# Patient Record
Sex: MALE | Race: WHITE | NOT HISPANIC OR LATINO | Employment: FULL TIME | URBAN - METROPOLITAN AREA
[De-identification: names, ages, dates, MRNs, and addresses within clinical notes are randomized per-mention and may not be internally consistent; named-entity substitution may affect disease eponyms.]

---

## 2018-05-06 LAB
ALBUMIN SERPL BCP-MCNC: 4.2 G/DL (ref 3.5–5.7)
ALP SERPL-CCNC: 85 IU/L (ref 40–150)
ALT SERPL W P-5'-P-CCNC: 298 IU/L (ref 0–50)
ANION GAP SERPL CALCULATED.3IONS-SCNC: 16.1 MM/L
APTT PPP: 27.7 SEC (ref 24.4–37.6)
AST SERPL W P-5'-P-CCNC: 158 U/L (ref 8–27)
BASOPHILS # BLD AUTO: 0.1 X3/UL (ref 0–0.3)
BASOPHILS # BLD AUTO: 0.8 % (ref 0–2)
BILIRUB SERPL-MCNC: 0.3 MG/DL (ref 0.3–1)
BUN SERPL-MCNC: 11 MG/DL (ref 7–25)
CALCIUM SERPL-MCNC: 9.1 MG/DL (ref 8.6–10.5)
CHLORIDE SERPL-SCNC: 105 MM/L (ref 98–107)
CO2 SERPL-SCNC: 22 MM/L (ref 21–31)
CREAT SERPL-MCNC: 0.83 MG/DL (ref 0.7–1.3)
DEPRECATED RDW RBC AUTO: 13.6 % (ref 11.5–14.5)
EGFR (HISTORICAL): > 60 GFR
EGFR AFRICAN AMERICAN (HISTORICAL): > 60 GFR
EOSINOPHIL # BLD AUTO: 0.2 X3/UL (ref 0–0.5)
EOSINOPHIL NFR BLD AUTO: 3.8 % (ref 0–5)
GLUCOSE (HISTORICAL): 141 MG/DL (ref 65–99)
HCT VFR BLD AUTO: 44.8 % (ref 42–52)
HGB BLD-MCNC: 15.4 G/DL (ref 14–18)
INR PPP: 1.12 (ref 0.9–1.5)
LYMPHOCYTES # BLD AUTO: 2.6 X3/UL (ref 1.2–4.2)
LYMPHOCYTES NFR BLD AUTO: 41.5 % (ref 20.5–51.1)
MCH RBC QN AUTO: 31.7 PG (ref 26–34)
MCHC RBC AUTO-ENTMCNC: 34.4 G/DL (ref 31–36)
MCV RBC AUTO: 92 FL (ref 81–99)
MONOCYTES # BLD AUTO: 0.6 X3/UL (ref 0–1)
MONOCYTES NFR BLD AUTO: 9.8 % (ref 1.7–12)
NEUTROPHILS # BLD AUTO: 2.8 X3/UL (ref 1.4–6.5)
NEUTS SEG NFR BLD AUTO: 44.1 % (ref 42.2–75.2)
OSMOLALITY, SERUM (HISTORICAL): 279 MOSM (ref 262–291)
PLATELET # BLD AUTO: 243 X3/UL (ref 130–400)
PMV BLD AUTO: 8.8 FL (ref 8.6–11.7)
POTASSIUM SERPL-SCNC: 4.1 MM/L (ref 3.5–5.5)
PROTHROMBIN TIME (HISTORICAL): 13 SEC (ref 10.1–12.9)
RBC # BLD AUTO: 4.87 X6/UL (ref 4.3–5.9)
SODIUM SERPL-SCNC: 139 MM/L (ref 134–143)
TOTAL PROTEIN (HISTORICAL): 8.2 G/DL (ref 6.4–8.9)
TROPONIN I SERPL-MCNC: < 0.03 NG/ML
WBC # BLD AUTO: 6.2 X3/UL (ref 4.8–10.8)

## 2020-01-23 ENCOUNTER — TRANSCRIBE ORDERS (OUTPATIENT)
Dept: URGENT CARE | Facility: CLINIC | Age: 55
End: 2020-01-23

## 2020-01-23 ENCOUNTER — APPOINTMENT (OUTPATIENT)
Dept: RADIOLOGY | Facility: CLINIC | Age: 55
End: 2020-01-23
Payer: OTHER MISCELLANEOUS

## 2020-01-23 ENCOUNTER — APPOINTMENT (OUTPATIENT)
Dept: URGENT CARE | Facility: CLINIC | Age: 55
End: 2020-01-23
Payer: OTHER MISCELLANEOUS

## 2020-01-23 DIAGNOSIS — S59.909A ELBOW INJURY, INITIAL ENCOUNTER: Primary | ICD-10-CM

## 2020-01-23 DIAGNOSIS — S59.909A ELBOW INJURY, INITIAL ENCOUNTER: ICD-10-CM

## 2020-01-23 PROCEDURE — 99283 EMERGENCY DEPT VISIT LOW MDM: CPT | Performed by: NURSE PRACTITIONER

## 2020-01-23 PROCEDURE — G0382 LEV 3 HOSP TYPE B ED VISIT: HCPCS | Performed by: NURSE PRACTITIONER

## 2020-01-23 PROCEDURE — 73080 X-RAY EXAM OF ELBOW: CPT

## 2020-01-27 ENCOUNTER — APPOINTMENT (OUTPATIENT)
Dept: URGENT CARE | Facility: CLINIC | Age: 55
End: 2020-01-27
Payer: OTHER MISCELLANEOUS

## 2020-01-27 PROCEDURE — 99213 OFFICE O/P EST LOW 20 MIN: CPT

## 2020-02-03 ENCOUNTER — APPOINTMENT (OUTPATIENT)
Dept: URGENT CARE | Facility: CLINIC | Age: 55
End: 2020-02-03
Payer: OTHER MISCELLANEOUS

## 2020-02-03 PROCEDURE — 99213 OFFICE O/P EST LOW 20 MIN: CPT

## 2020-02-10 ENCOUNTER — APPOINTMENT (OUTPATIENT)
Dept: URGENT CARE | Facility: CLINIC | Age: 55
End: 2020-02-10
Payer: OTHER MISCELLANEOUS

## 2020-02-10 PROCEDURE — 99213 OFFICE O/P EST LOW 20 MIN: CPT | Performed by: FAMILY MEDICINE

## 2020-02-17 ENCOUNTER — APPOINTMENT (OUTPATIENT)
Dept: URGENT CARE | Facility: CLINIC | Age: 55
End: 2020-02-17
Payer: OTHER MISCELLANEOUS

## 2020-02-17 PROCEDURE — 99213 OFFICE O/P EST LOW 20 MIN: CPT | Performed by: PHYSICIAN ASSISTANT

## 2021-04-23 ENCOUNTER — TRANSCRIBE ORDERS (OUTPATIENT)
Dept: ADMINISTRATIVE | Facility: HOSPITAL | Age: 56
End: 2021-04-23

## 2021-04-23 DIAGNOSIS — K76.9 LIVER DISEASE: ICD-10-CM

## 2021-04-23 DIAGNOSIS — B19.20 HEPATITIS C VIRUS INFECTION WITHOUT HEPATIC COMA, UNSPECIFIED CHRONICITY: Primary | ICD-10-CM

## 2021-04-23 DIAGNOSIS — R79.89 ABNORMAL LIVER FUNCTION TEST: ICD-10-CM

## 2021-05-03 ENCOUNTER — HOSPITAL ENCOUNTER (OUTPATIENT)
Dept: ULTRASOUND IMAGING | Facility: HOSPITAL | Age: 56
Discharge: HOME | End: 2021-05-03
Payer: COMMERCIAL

## 2021-05-03 DIAGNOSIS — B19.20 HEPATITIS C VIRUS INFECTION WITHOUT HEPATIC COMA, UNSPECIFIED CHRONICITY: ICD-10-CM

## 2021-05-03 DIAGNOSIS — R79.89 ABNORMAL LIVER FUNCTION TEST: ICD-10-CM

## 2021-05-03 DIAGNOSIS — K76.9 LIVER DISEASE: ICD-10-CM

## 2021-05-03 PROCEDURE — 76700 US EXAM ABDOM COMPLETE: CPT

## 2022-03-15 ENCOUNTER — APPOINTMENT (EMERGENCY)
Dept: RADIOLOGY | Facility: HOSPITAL | Age: 57
End: 2022-03-15

## 2022-03-15 ENCOUNTER — APPOINTMENT (EMERGENCY)
Dept: CT IMAGING | Facility: HOSPITAL | Age: 57
End: 2022-03-15

## 2022-03-15 ENCOUNTER — HOSPITAL ENCOUNTER (OUTPATIENT)
Facility: HOSPITAL | Age: 57
Setting detail: OBSERVATION
Discharge: HOME | End: 2022-03-16
Attending: EMERGENCY MEDICINE | Admitting: INTERNAL MEDICINE

## 2022-03-15 ENCOUNTER — APPOINTMENT (OUTPATIENT)
Dept: MRI IMAGING | Facility: HOSPITAL | Age: 57
End: 2022-03-15

## 2022-03-15 DIAGNOSIS — E78.5 HYPERLIPIDEMIA: ICD-10-CM

## 2022-03-15 DIAGNOSIS — E11.9 DIABETES (HCC): ICD-10-CM

## 2022-03-15 DIAGNOSIS — G45.9 TIA (TRANSIENT ISCHEMIC ATTACK): ICD-10-CM

## 2022-03-15 DIAGNOSIS — R73.9 HYPERGLYCEMIA: Primary | ICD-10-CM

## 2022-03-15 PROBLEM — R29.90 STROKE-LIKE SYMPTOMS: Status: ACTIVE | Noted: 2022-03-15

## 2022-03-15 PROBLEM — Z86.19 HISTORY OF HEPATITIS C: Status: ACTIVE | Noted: 2022-03-15

## 2022-03-15 LAB
ANION GAP SERPL CALCULATED.3IONS-SCNC: 10 MMOL/L (ref 4–13)
APTT PPP: 30 SECONDS (ref 23–37)
ATRIAL RATE: 73 BPM
BASE EX.OXY STD BLDV CALC-SCNC: 92 % (ref 60–80)
BASE EXCESS BLDV CALC-SCNC: -0.7 MMOL/L
BASOPHILS # BLD AUTO: 0.03 THOUSANDS/ΜL (ref 0–0.1)
BASOPHILS NFR BLD AUTO: 0 % (ref 0–1)
BETA-HYDROXYBUTYRATE: 1.1 MMOL/L
BILIRUB UR QL STRIP: NEGATIVE
BUN SERPL-MCNC: 28 MG/DL (ref 5–25)
CALCIUM SERPL-MCNC: 9.6 MG/DL (ref 8.4–10.2)
CARDIAC TROPONIN I PNL SERPL HS: 3 NG/L
CHLORIDE SERPL-SCNC: 96 MMOL/L (ref 96–108)
CLARITY UR: CLEAR
CO2 SERPL-SCNC: 24 MMOL/L (ref 21–32)
COLOR UR: YELLOW
CREAT SERPL-MCNC: 0.84 MG/DL (ref 0.6–1.3)
EOSINOPHIL # BLD AUTO: 0.08 THOUSAND/ΜL (ref 0–0.61)
EOSINOPHIL NFR BLD AUTO: 1 % (ref 0–6)
ERYTHROCYTE [DISTWIDTH] IN BLOOD BY AUTOMATED COUNT: 12.8 % (ref 11.6–15.1)
EST. AVERAGE GLUCOSE BLD GHB EST-MCNC: 338 MG/DL
EST. AVERAGE GLUCOSE BLD GHB EST-MCNC: >355 MG/DL
GFR SERPL CREATININE-BSD FRML MDRD: 99 ML/MIN/1.73SQ M
GLUCOSE SERPL-MCNC: 207 MG/DL (ref 65–140)
GLUCOSE SERPL-MCNC: 289 MG/DL (ref 65–140)
GLUCOSE SERPL-MCNC: 299 MG/DL (ref 65–140)
GLUCOSE SERPL-MCNC: 365 MG/DL (ref 65–140)
GLUCOSE UR STRIP-MCNC: ABNORMAL MG/DL
HBA1C MFR BLD: 13.4 %
HBA1C MFR BLD: >14 %
HCO3 BLDV-SCNC: 24 MMOL/L (ref 24–30)
HCT VFR BLD AUTO: 42.6 % (ref 36.5–49.3)
HGB BLD-MCNC: 14.2 G/DL (ref 12–17)
HGB UR QL STRIP.AUTO: NEGATIVE
IMM GRANULOCYTES # BLD AUTO: 0.01 THOUSAND/UL (ref 0–0.2)
IMM GRANULOCYTES NFR BLD AUTO: 0 % (ref 0–2)
INR PPP: 0.99 (ref 0.84–1.19)
KETONES UR STRIP-MCNC: ABNORMAL MG/DL
LEUKOCYTE ESTERASE UR QL STRIP: NEGATIVE
LYMPHOCYTES # BLD AUTO: 2.49 THOUSANDS/ΜL (ref 0.6–4.47)
LYMPHOCYTES NFR BLD AUTO: 35 % (ref 14–44)
MCH RBC QN AUTO: 29.6 PG (ref 26.8–34.3)
MCHC RBC AUTO-ENTMCNC: 33.3 G/DL (ref 31.4–37.4)
MCV RBC AUTO: 89 FL (ref 82–98)
MONOCYTES # BLD AUTO: 0.67 THOUSAND/ΜL (ref 0.17–1.22)
MONOCYTES NFR BLD AUTO: 9 % (ref 4–12)
NEUTROPHILS # BLD AUTO: 3.9 THOUSANDS/ΜL (ref 1.85–7.62)
NEUTS SEG NFR BLD AUTO: 55 % (ref 43–75)
NITRITE UR QL STRIP: NEGATIVE
NRBC BLD AUTO-RTO: 0 /100 WBCS
O2 CT BLDV-SCNC: 19 ML/DL
P AXIS: 73 DEGREES
PCO2 BLDV: 40 MM HG (ref 42–50)
PH BLDV: 7.4 [PH] (ref 7.3–7.4)
PH UR STRIP.AUTO: 5.5 [PH]
PLATELET # BLD AUTO: 219 THOUSANDS/UL (ref 149–390)
PMV BLD AUTO: 10.3 FL (ref 8.9–12.7)
PO2 BLDV: 76.6 MM HG (ref 35–45)
POTASSIUM SERPL-SCNC: 4 MMOL/L (ref 3.5–5.3)
PR INTERVAL: 186 MS
PROT UR STRIP-MCNC: NEGATIVE MG/DL
PROTHROMBIN TIME: 13 SECONDS (ref 11.6–14.5)
QRS AXIS: 66 DEGREES
QRSD INTERVAL: 80 MS
QT INTERVAL: 380 MS
QTC INTERVAL: 418 MS
RBC # BLD AUTO: 4.8 MILLION/UL (ref 3.88–5.62)
SODIUM SERPL-SCNC: 130 MMOL/L (ref 135–147)
SP GR UR STRIP.AUTO: 1.01 (ref 1–1.03)
T WAVE AXIS: 54 DEGREES
TSH SERPL DL<=0.05 MIU/L-ACNC: 0.49 UIU/ML (ref 0.45–5.33)
UROBILINOGEN UR QL STRIP.AUTO: 0.2 E.U./DL
VENTRICULAR RATE: 73 BPM
WBC # BLD AUTO: 7.18 THOUSAND/UL (ref 4.31–10.16)

## 2022-03-15 PROCEDURE — 82948 REAGENT STRIP/BLOOD GLUCOSE: CPT

## 2022-03-15 PROCEDURE — 80048 BASIC METABOLIC PNL TOTAL CA: CPT | Performed by: EMERGENCY MEDICINE

## 2022-03-15 PROCEDURE — 84443 ASSAY THYROID STIM HORMONE: CPT | Performed by: NURSE PRACTITIONER

## 2022-03-15 PROCEDURE — 85610 PROTHROMBIN TIME: CPT | Performed by: EMERGENCY MEDICINE

## 2022-03-15 PROCEDURE — 96361 HYDRATE IV INFUSION ADD-ON: CPT

## 2022-03-15 PROCEDURE — 85730 THROMBOPLASTIN TIME PARTIAL: CPT | Performed by: EMERGENCY MEDICINE

## 2022-03-15 PROCEDURE — 93005 ELECTROCARDIOGRAM TRACING: CPT

## 2022-03-15 PROCEDURE — G1004 CDSM NDSC: HCPCS

## 2022-03-15 PROCEDURE — 83036 HEMOGLOBIN GLYCOSYLATED A1C: CPT | Performed by: NURSE PRACTITIONER

## 2022-03-15 PROCEDURE — 99220 PR INITIAL OBSERVATION CARE/DAY 70 MINUTES: CPT | Performed by: NURSE PRACTITIONER

## 2022-03-15 PROCEDURE — 82805 BLOOD GASES W/O2 SATURATION: CPT | Performed by: EMERGENCY MEDICINE

## 2022-03-15 PROCEDURE — 96360 HYDRATION IV INFUSION INIT: CPT

## 2022-03-15 PROCEDURE — 99285 EMERGENCY DEPT VISIT HI MDM: CPT

## 2022-03-15 PROCEDURE — 85025 COMPLETE CBC W/AUTO DIFF WBC: CPT | Performed by: EMERGENCY MEDICINE

## 2022-03-15 PROCEDURE — 70551 MRI BRAIN STEM W/O DYE: CPT

## 2022-03-15 PROCEDURE — 36415 COLL VENOUS BLD VENIPUNCTURE: CPT | Performed by: EMERGENCY MEDICINE

## 2022-03-15 PROCEDURE — 70496 CT ANGIOGRAPHY HEAD: CPT

## 2022-03-15 PROCEDURE — 81003 URINALYSIS AUTO W/O SCOPE: CPT | Performed by: EMERGENCY MEDICINE

## 2022-03-15 PROCEDURE — 70498 CT ANGIOGRAPHY NECK: CPT

## 2022-03-15 PROCEDURE — 92610 EVALUATE SWALLOWING FUNCTION: CPT

## 2022-03-15 PROCEDURE — 83036 HEMOGLOBIN GLYCOSYLATED A1C: CPT | Performed by: EMERGENCY MEDICINE

## 2022-03-15 PROCEDURE — 99285 EMERGENCY DEPT VISIT HI MDM: CPT | Performed by: EMERGENCY MEDICINE

## 2022-03-15 PROCEDURE — 71045 X-RAY EXAM CHEST 1 VIEW: CPT

## 2022-03-15 PROCEDURE — 82010 KETONE BODYS QUAN: CPT | Performed by: EMERGENCY MEDICINE

## 2022-03-15 PROCEDURE — 93010 ELECTROCARDIOGRAM REPORT: CPT | Performed by: INTERNAL MEDICINE

## 2022-03-15 PROCEDURE — 84484 ASSAY OF TROPONIN QUANT: CPT | Performed by: EMERGENCY MEDICINE

## 2022-03-15 RX ORDER — ATORVASTATIN CALCIUM 40 MG/1
80 TABLET, FILM COATED ORAL
Status: DISCONTINUED | OUTPATIENT
Start: 2022-03-15 | End: 2022-03-15

## 2022-03-15 RX ORDER — ASPIRIN 81 MG/1
81 TABLET, CHEWABLE ORAL DAILY
Status: DISCONTINUED | OUTPATIENT
Start: 2022-03-16 | End: 2022-03-16 | Stop reason: HOSPADM

## 2022-03-15 RX ORDER — ASCORBIC ACID 500 MG
500 TABLET ORAL 2 TIMES DAILY
COMMUNITY

## 2022-03-15 RX ORDER — ASCORBIC ACID 500 MG
500 TABLET ORAL 2 TIMES DAILY
Status: DISCONTINUED | OUTPATIENT
Start: 2022-03-15 | End: 2022-03-16 | Stop reason: HOSPADM

## 2022-03-15 RX ORDER — ACETAMINOPHEN 325 MG/1
650 TABLET ORAL EVERY 6 HOURS PRN
Status: DISCONTINUED | OUTPATIENT
Start: 2022-03-15 | End: 2022-03-16 | Stop reason: HOSPADM

## 2022-03-15 RX ORDER — INSULIN GLARGINE 100 [IU]/ML
8 INJECTION, SOLUTION SUBCUTANEOUS EVERY MORNING
Status: DISCONTINUED | OUTPATIENT
Start: 2022-03-16 | End: 2022-03-16 | Stop reason: HOSPADM

## 2022-03-15 RX ORDER — ERGOCALCIFEROL (VITAMIN D2) 1250 MCG
50000 CAPSULE ORAL
COMMUNITY

## 2022-03-15 RX ORDER — ASPIRIN 325 MG
325 TABLET ORAL ONCE
Status: COMPLETED | OUTPATIENT
Start: 2022-03-15 | End: 2022-03-15

## 2022-03-15 RX ORDER — SODIUM CHLORIDE, SODIUM GLUCONATE, SODIUM ACETATE, POTASSIUM CHLORIDE, MAGNESIUM CHLORIDE, SODIUM PHOSPHATE, DIBASIC, AND POTASSIUM PHOSPHATE .53; .5; .37; .037; .03; .012; .00082 G/100ML; G/100ML; G/100ML; G/100ML; G/100ML; G/100ML; G/100ML
100 INJECTION, SOLUTION INTRAVENOUS CONTINUOUS
Status: DISCONTINUED | OUTPATIENT
Start: 2022-03-15 | End: 2022-03-16 | Stop reason: HOSPADM

## 2022-03-15 RX ORDER — INSULIN GLARGINE 100 [IU]/ML
8 INJECTION, SOLUTION SUBCUTANEOUS ONCE
Status: DISCONTINUED | OUTPATIENT
Start: 2022-03-15 | End: 2022-03-16 | Stop reason: HOSPADM

## 2022-03-15 RX ORDER — ATORVASTATIN CALCIUM 40 MG/1
40 TABLET, FILM COATED ORAL EVERY EVENING
Status: DISCONTINUED | OUTPATIENT
Start: 2022-03-15 | End: 2022-03-15

## 2022-03-15 RX ORDER — ATORVASTATIN CALCIUM 40 MG/1
40 TABLET, FILM COATED ORAL
Status: DISCONTINUED | OUTPATIENT
Start: 2022-03-16 | End: 2022-03-16 | Stop reason: HOSPADM

## 2022-03-15 RX ADMIN — INSULIN LISPRO 3 UNITS: 100 INJECTION, SOLUTION INTRAVENOUS; SUBCUTANEOUS at 17:25

## 2022-03-15 RX ADMIN — SODIUM CHLORIDE, SODIUM GLUCONATE, SODIUM ACETATE, POTASSIUM CHLORIDE, MAGNESIUM CHLORIDE, SODIUM PHOSPHATE, DIBASIC, AND POTASSIUM PHOSPHATE 100 ML/HR: .53; .5; .37; .037; .03; .012; .00082 INJECTION, SOLUTION INTRAVENOUS at 14:14

## 2022-03-15 RX ADMIN — OXYCODONE HYDROCHLORIDE AND ACETAMINOPHEN 500 MG: 500 TABLET ORAL at 17:25

## 2022-03-15 RX ADMIN — Medication 1 TABLET: at 14:14

## 2022-03-15 RX ADMIN — INSULIN LISPRO 6 UNITS: 100 INJECTION, SOLUTION INTRAVENOUS; SUBCUTANEOUS at 21:32

## 2022-03-15 RX ADMIN — SODIUM CHLORIDE 1000 ML: 0.9 INJECTION, SOLUTION INTRAVENOUS at 09:15

## 2022-03-15 RX ADMIN — INSULIN LISPRO 2 UNITS: 100 INJECTION, SOLUTION INTRAVENOUS; SUBCUTANEOUS at 17:25

## 2022-03-15 RX ADMIN — ASPIRIN 325 MG: 325 TABLET ORAL at 14:14

## 2022-03-15 RX ADMIN — IOHEXOL 85 ML: 350 INJECTION, SOLUTION INTRAVENOUS at 09:55

## 2022-03-15 NOTE — ASSESSMENT & PLAN NOTE
· The patient admits to visual disturbances, dizziness, generalized weakness which has been going on for the past 2-3 weeks  · Suspected that this could be related to hyperglycemia/newly diagnosed diabetes however will rule out TIA/CVA  · Stroke pathway  · Neurologic consult  · MRI brain  · Will hold off on echocardiogram for now  · Hemoglobin A1c, lipid panel

## 2022-03-15 NOTE — SPEECH THERAPY NOTE
Speech Language/Pathology  Speech/Language Pathology  Assessment    Patient Name: Carolina Castillo  [id number] Date: 3/15/2022     Problem List  Principal Problem:    Hyperglycemia  Active Problems:    History of hepatitis C    Stroke-like symptoms    Past Medical History  Past Medical History:   Diagnosis Date    Cirrhosis ([facility] [address] )     Hepatitis C virus infection cured after antiviral drug therapy      Past Surgical History  History reviewed  No pertinent surgical history  03/15/22 1800   Patient Information   Current Medical hyperglycemia vs CVA work up   Special Studies CT   Past Medical History none noted on chart   Swallow Information   Current Risks for Dysphagia & Aspiration   (None)   Current Diet Regular; Thin liquid   Baseline Diet Regular; Thin liquids   Baseline Assessment   Behavior/Cognition Alert; Cooperative; Interactive   Speech/Language Status WFL   Patient Positioning Upright in bed   Swallow Mechanism Exam   Labial Symmetry WFL   Labial Strength WFL   Labial ROM WFL   Labial Sensation WFL   Facial Symmetry WFL   Facial Strength WFL   Facial ROM WFL   Facial Sensation WFL   Lingual Symmetry WFL   Lingual Strength WFL   Lingual ROM WFL   Lingual Sensation WFL   Dentition Adequate   Volitional Cough Strong   Consistencies Assessed and Performance   Materials Admnistered Regular/Solid; Thin liquid   Oral Stage WFL   Phargngeal Stage WFL   Swallow Mechanics WFL;Swallow initation; Appears prompt;Good Larygneal rise   Esophageal Concerns No s/s reported   Summary   Swallow Summary Patient demonstrating appropriate swallow function and engaged in complex conversation topics with ease  Pt demonstrates no overt s/s of penetration or aspiration, clear vocal quality and no ongoing concerns  No ST warranted for f/u

## 2022-03-15 NOTE — PLAN OF CARE
Problem: Potential for Falls  Goal: Patient will remain free of falls  Description: INTERVENTIONS:  - Educate patient/family on patient safety including physical limitations  - Instruct patient to call for assistance with activity   - Consult OT/PT to assist with strengthening/mobility   - Keep Call bell within reach  - Keep bed low and locked with side rails adjusted as appropriate  - Keep care items and personal belongings within reach  - Initiate and maintain comfort rounds  - Make Fall Risk Sign visible to staff  Problem: Neurological Deficit  Goal: Neurological status is stable or improving  Description: Interventions:  - Monitor and assess patient's level of consciousness, motor function, sensory function, and level of assistance needed for ADLs  - Monitor and report changes from baseline  Collaborate with interdisciplinary team to initiate plan and implement interventions as ordered  - Provide and maintain a safe environment  - Consider seizure precautions  - Consider fall precautions  - Consider aspiration precautions  - Consider bleeding precautions  3/15/2022 1620 by Mimi Montero RN  Outcome: Progressing  3/15/2022 1616 by Mimi Montero RN  Outcome: Progressing     Problem: Activity Intolerance/Impaired Mobility  Goal: Mobility/activity is maintained at optimum level for patient  Description: Interventions:  - Assess and monitor patient  barriers to mobility and need for assistive/adaptive devices  - Assess patient's emotional response to limitations  - Collaborate with interdisciplinary team and initiate plans and interventions as ordered  - Encourage independent activity per ability   - Maintain proper body alignment  - Perform active/passive rom as tolerated/ordered    - Plan activities to conserve energy   - Turn patient as appropriate  3/15/2022 1620 by Mimi Montero RN  Outcome: Progressing  3/15/2022 1616 by Mimi Montero RN  Outcome: Progressing     Problem: Communication Impairment  Goal: Ability to express needs and understand communication  Description: Assess patient's communication skills and ability to understand information  Patient will demonstrate use of effective communication techniques, alternative methods of communication and understanding even if not able to speak  - Encourage communication and provide alternate methods of communication as needed  - Collaborate with case management/ for discharge needs  - Include patient/family/caregiver in decisions related to communication  3/15/2022 1620 by Marquis Yossi RN  Outcome: Progressing  3/15/2022 1616 by Marquis Yossi RN  Outcome: Progressing     Problem: Potential for Aspiration  Goal: Non-ventilated patient's risk of aspiration is minimized  Description: Assess and monitor vital signs, respiratory status, and labs (WBC)  Monitor for signs of aspiration (tachypnea, cough, rales, wheezing, cyanosis, fever)  - Assess and monitor patient's ability to swallow  - Place patient up in chair to eat if possible  - HOB up at 90 degrees to eat if unable to get patient up into chair   - Supervise patient during oral intake  - Instruct patient/ family to take small bites  - Instruct patient/ family to take small single sips when taking liquids  - Follow patient-specific strategies generated by speech pathologist   Outcome: Progressing  Goal: Ventilated patient's risk of aspiration is minimized  Description: Assess and monitor vital signs, respiratory status, airway cuff pressure, and labs (WBC)  Monitor for signs of aspiration (tachypnea, cough, rales, wheezing, cyanosis, fever)  - Elevate head of bed 30 degrees if patient has tube feeding   - Monitor tube feeding    Outcome: Progressing     Problem: NEUROSENSORY - ADULT  Goal: Achieves stable or improved neurological status  Description: INTERVENTIONS  - Monitor and report changes in neurological status  - Monitor vital signs such as temperature, blood pressure, glucose, and any other labs ordered   - Initiate measures to prevent increased intracranial pressure  - Monitor for seizure activity and implement precautions if appropriate      Outcome: Progressing     Problem: CARDIOVASCULAR - ADULT  Goal: Maintains optimal cardiac output and hemodynamic stability  Description: INTERVENTIONS:  - Monitor I/O, vital signs and rhythm  - Monitor for S/S and trends of decreased cardiac output  - Administer and titrate ordered vasoactive medications to optimize hemodynamic stability  - Assess quality of pulses, skin color and temperature  - Assess for signs of decreased coronary artery perfusion  - Instruct patient to report change in severity of symptoms  Outcome: Progressing     Problem: METABOLIC, FLUID AND ELECTROLYTES - ADULT  Goal: Electrolytes maintained within normal limits  Description: INTERVENTIONS:  - Monitor labs and assess patient for signs and symptoms of electrolyte imbalances  - Administer electrolyte replacement as ordered  - Monitor response to electrolyte replacements, including repeat lab results as appropriate  - Instruct patient on fluid and nutrition as appropriate  Outcome: Progressing  Goal: Fluid balance maintained  Description: INTERVENTIONS:  - Monitor labs   - Monitor I/O and WT  - Instruct patient on fluid and nutrition as appropriate  - Assess for signs & symptoms of volume excess or deficit  Outcome: Progressing  Goal: Glucose maintained within target range  Description: INTERVENTIONS:  - Monitor Blood Glucose as ordered  - Assess for signs and symptoms of hyperglycemia and hypoglycemia  - Administer ordered medications to maintain glucose within target range  - Assess nutritional intake and initiate nutrition service referral as needed  Outcome: Progressing     Problem: Nutrition/Hydration-ADULT  Goal: Nutrient/Hydration intake appropriate for improving, restoring or maintaining nutritional needs  Description: Monitor and assess patient's nutrition/hydration status for malnutrition  Collaborate with interdisciplinary team and initiate plan and interventions as ordered  Monitor patient's weight and dietary intake as ordered or per policy  Utilize nutrition screening tool and intervene as necessary  Determine patient's food preferences and provide high-protein, high-caloric foods as appropriate       INTERVENTIONS:  - Monitor oral intake, urinary output, labs, and treatment plans  - Assess nutrition and hydration status and recommend course of action  - Evaluate amount of meals eaten  - Assist patient with eating if necessary   - Allow adequate time for meals  - Recommend/ encourage appropriate diets, oral nutritional supplements, and vitamin/mineral supplements  - Order, calculate, and assess calorie counts as needed  - Recommend, monitor, and adjust tube feedings and TPN/PPN based on assessed needs  - Assess need for intravenous fluids  - Provide specific nutrition/hydration education as appropriate  - Include patient/family/caregiver in decisions related to nutrition  Outcome: Progressing     Problem: PAIN - ADULT  Goal: Verbalizes/displays adequate comfort level or baseline comfort level  Description: Interventions:  - Encourage patient to monitor pain and request assistance  - Assess pain using appropriate pain scale  - Administer analgesics based on type and severity of pain and evaluate response  - Implement non-pharmacological measures as appropriate and evaluate response  - Consider cultural and social influences on pain and pain management  - Notify physician/advanced practitioner if interventions unsuccessful or patient reports new pain  Outcome: Progressing     Problem: SAFETY ADULT  Goal: Patient will remain free of falls  Description: INTERVENTIONS:  - Educate patient/family on patient safety including physical limitations  - Instruct patient to call for assistance with activity   - Consult OT/PT to assist with strengthening/mobility   - Keep Call bell within reach  - Keep bed low and locked with side rails adjusted as appropriate  - Keep care items and personal belongings within reach  - Initiate and maintain comfort rounds  - Make Fall Risk Sign visible to staff  - Offer Toileting every 2 Hours, in advance of need     Apply yellow socks and bracelet for high fall risk patients  - Consider moving patient to room near nurses station  Outcome: Progressing  Goal: Maintain or return to baseline ADL function  Description: INTERVENTIONS:  -  Assess patient's ability to carry out ADLs; assess patient's baseline for ADL function and identify physical deficits which impact ability to perform ADLs (bathing, care of mouth/teeth, toileting, grooming, dressing, etc )  - Assess/evaluate cause of self-care deficits   - Assess range of motion  - Assess patient's mobility; develop plan if impaired  - Assess patient's need for assistive devices and provide as appropriate  - Encourage maximum independence but intervene and supervise when necessary  - Involve family in performance of ADLs  - Assess for home care needs following discharge   - Consider OT consult to assist with ADL evaluation and planning for discharge  - Provide patient education as appropriate  Outcome: Progressing  tivity level   Outcome: Progressing     - Consider moving patient to room near nurses station  Outcome: Progressing

## 2022-03-15 NOTE — ASSESSMENT & PLAN NOTE
· Hemoglobin A1c of 7 8 on 11/2021  · This was found incidentally on his blood work while he was under hepatitis C treatment  · The patient has been checking his blood sugar at home and noticed that it has been between 400-500  Currently not on any medications  The patient did have some diet modifications and has lost almost 40 lb over the past few months  Has just established care with a new PCP and the appointment is not until April    · Will give 8 units of Lantus and Humalog 5 units now  · Continue with IV fluid  · No evidence of DKA, mild elevations of beta hydroxybutyrate- 1 1   · Pseudo hyponatremia due to hyperglycemia  · Started on SSI, meals coverage with lantus; pending repeat hemoglobin A1c and will need regimen for anti hyperglycemia on discharge and possible endocrinology follow-up

## 2022-03-15 NOTE — ED NOTES
Per Dr Mike Zavala, patient is not a stroke alert but will require stroke imaging       Sarah Obregon RN  03/15/22 9621

## 2022-03-15 NOTE — ED PROVIDER NOTES
History  Chief Complaint   Patient presents with    Hyperglycemia - Symptomatic     started noticing last year that fasting glucose in the mid 100's  over the last few days sugar has been in excess of 500  visual changes  61-year-old male presenting for elevated blood sugars  Wife is present in the room states that also approximately 1 hour prior to arrival patient had a few minute episode of slurring of his speech but no other symptoms  This resolved on its own  He reportedly has had this a few times over last 6 months  He states he started noticing his elevated blood sugar over last 6 months to 1 year although over last 6 months he says this got significantly worse  He also states that he feels like his vision is getting worse  Denies sore throat, cough, shortness of breath, abdominal pain, vomiting diarrhea constipation dysuria, hematuria  Patient also complaining of increased thirst, increased urinary frequency, chest pain which started earlier today  Prior to Admission Medications   Prescriptions Last Dose Informant Patient Reported? Taking? Garlic 840 MG CAPS   Yes No   Sig: Take by mouth   Multiple Vitamin (MULTIVITAMIN ADULT PO)   Yes No   Sig: Take 1 tablet by mouth daily   OMEGA-3 FATTY ACIDS PO   Yes No   Sig: Take by mouth   ascorbic acid (VITAMIN C) 500 MG tablet   Yes No   Sig: Take 500 mg by mouth 2 (two) times a day   ergocalciferol (ERGOCALCIFEROL) 1 25 MG (35960 UT) capsule   Yes No   Sig: Take 50,000 Units by mouth      Facility-Administered Medications: None       Past Medical History:   Diagnosis Date    Cirrhosis ([facility] [address] )     Hepatitis C virus infection cured after antiviral drug therapy        History reviewed  No pertinent surgical history  Family History   Problem Relation Age of Onset    Diabetes Mother     Diabetes Father      I have reviewed and agree with the history as documented      E-Cigarette/Vaping     E-Cigarette/Vaping Substances     Social History Tobacco Use    Smoking status: Former Smoker     Quit date:      Years since quittin 2    Smokeless tobacco: Never Used   Substance Use Topics    Alcohol use: Not Currently    Drug use: Not Currently       Review of Systems   Cardiovascular: Positive for chest pain  Gastrointestinal: Positive for nausea  Neurological: Positive for speech difficulty (resolved) and headaches  Negative for dizziness, facial asymmetry, weakness, light-headedness and numbness  All other systems reviewed and are negative  Physical Exam  Physical Exam  Vitals and nursing note reviewed  Constitutional:       General: He is not in acute distress  Appearance: He is well-developed  He is not diaphoretic  HENT:      Head: Normocephalic and atraumatic  Right Ear: External ear normal       Left Ear: External ear normal       Nose: Nose normal    Eyes:      General: No scleral icterus  Right eye: No discharge  Left eye: No discharge  Conjunctiva/sclera: Conjunctivae normal    Cardiovascular:      Rate and Rhythm: Normal rate and regular rhythm  Heart sounds: Normal heart sounds  No murmur heard  No friction rub  No gallop  Pulmonary:      Effort: Pulmonary effort is normal  No respiratory distress  Breath sounds: Normal breath sounds  No wheezing or rales  Abdominal:      General: Bowel sounds are normal  There is no distension  Palpations: Abdomen is soft  There is no mass  Tenderness: There is no abdominal tenderness  There is no guarding  Musculoskeletal:         General: No tenderness or deformity  Normal range of motion  Cervical back: Normal range of motion and neck supple  Skin:     General: Skin is warm and dry  Coloration: Skin is not pale  Findings: No erythema or rash  Neurological:      General: No focal deficit present  Mental Status: He is alert and oriented to person, place, and time  Mental status is at baseline  Cranial Nerves: No cranial nerve deficit  Sensory: No sensory deficit  Motor: No weakness  Coordination: Coordination normal       Gait: Gait normal       Comments: 5/5 strength x 4 extremities  Gross sensation intact  CN intact  No Dysmetria or Dysdiadochokinesia  GCS 15  No pronator drift  No gait ataxia      Psychiatric:         Behavior: Behavior normal          Thought Content:  Thought content normal          Judgment: Judgment normal          Vital Signs  ED Triage Vitals [03/15/22 0843]   Temperature Pulse Respirations Blood Pressure SpO2   (!) 96 8 °F (36 °C) 74 18 145/81 96 %      Temp Source Heart Rate Source Patient Position - Orthostatic VS BP Location FiO2 (%)   Tympanic Monitor Sitting Left arm --      Pain Score       2           Vitals:    03/15/22 1050 03/15/22 1100 03/15/22 1130 03/15/22 1200   BP: 123/72 124/75 118/74 128/79   Pulse: 59 56 56 58   Patient Position - Orthostatic VS:             Visual Acuity  Visual Acuity      Most Recent Value   L Pupil Size (mm) 3   R Pupil Size (mm) 3          ED Medications  Medications   aspirin tablet 325 mg (has no administration in time range)   atorvastatin (LIPITOR) tablet 80 mg (has no administration in time range)   sodium chloride 0 9 % bolus 1,000 mL (0 mL Intravenous Stopped 3/15/22 1115)   iohexol (OMNIPAQUE) 350 MG/ML injection (SINGLE-DOSE) 85 mL (85 mL Intravenous Given 3/15/22 0955)       Diagnostic Studies  Results Reviewed     Procedure Component Value Units Date/Time    HS Troponin 0hr (reflex protocol) [[id number]]  (Normal) Collected: 03/15/22 0910    Lab Status: Final result Specimen: Blood from Arm, Right Updated: 03/15/22 0945     hs TnI 0hr 3 ng/L     Basic metabolic panel [[id number]]  (Abnormal) Collected: 03/15/22 0910    Lab Status: Final result Specimen: Blood from Arm, Right Updated: 03/15/22 0941     Sodium 130 mmol/L      Potassium 4 0 mmol/L      Chloride 96 mmol/L      CO2 24 mmol/L      ANION GAP 10 mmol/L BUN 28 mg/dL      Creatinine 0 84 mg/dL      Glucose 299 mg/dL      Calcium 9 6 mg/dL      eGFR 99 ml/min/1 73sq m     Narrative:      National Kidney Disease Foundation guidelines for Chronic Kidney Disease (CKD):     Stage 1 with normal or high GFR (GFR > 90 mL/min/1 73 square meters)    Stage 2 Mild CKD (GFR = 60-89 mL/min/1 73 square meters)    Stage 3A Moderate CKD (GFR = 45-59 mL/min/1 73 square meters)    Stage 3B Moderate CKD (GFR = 30-44 mL/min/1 73 square meters)    Stage 4 Severe CKD (GFR = 15-29 mL/min/1 73 square meters)    Stage 5 End Stage CKD (GFR <15 mL/min/1 73 square meters)  Note: GFR calculation is accurate only with a steady state creatinine    Protime-INR [[id number]]  (Normal) Collected: 03/15/22 0910    Lab Status: Final result Specimen: Blood from Arm, Right Updated: 03/15/22 0933     Protime 13 0 seconds      INR 0 99    APTT [[id number]]  (Normal) Collected: 03/15/22 0910    Lab Status: Final result Specimen: Blood from Arm, Right Updated: 03/15/22 0933     PTT 30 seconds     Blood gas, venous [[id number]]  (Abnormal) Collected: 03/15/22 0910    Lab Status: Final result Specimen: Blood from Arm, Right Updated: 03/15/22 0927     pH, Vitaliy 7 396     pCO2, Vitaliy 40 0 mm Hg      pO2, Vitaliy 76 6 mm Hg      HCO3, Vitaliy 24 0 mmol/L      Base Excess, Vitaliy -0 7 mmol/L      O2 Content, Vitaliy 19 0 ml/dL      O2 HGB, VENOUS 92 0 %     UA w Reflex to Microscopic w Reflex to Culture [[id number]]  (Abnormal) Collected: 03/15/22 0915    Lab Status: Final result Specimen: Urine, Clean Catch Updated: 03/15/22 0925     Color, UA Yellow     Clarity, UA Clear     Specific Gravity, UA 1 010     pH, UA 5 5     Leukocytes, UA Negative     Nitrite, UA Negative     Protein, UA Negative mg/dl      Glucose, UA 3+ mg/dl      Ketones, UA 15 (1+) mg/dl      Urobilinogen, UA 0 2 E U /dl      Bilirubin, UA Negative     Blood, UA Negative    Beta Hydroxybutyrate [[id number]]  (Abnormal) Collected: 03/15/22 0910    Lab Status: Final result Specimen: Blood from Arm, Right Updated: 03/15/22 0922     BETA-HYDROXYBUTYRATE 1 1 mmol/L     CBC and differential [[id number]] Collected: 03/15/22 0910    Lab Status: Final result Specimen: Blood from Arm, Right Updated: 03/15/22 0922     WBC 7 18 Thousand/uL      RBC 4 80 Million/uL      Hemoglobin 14 2 g/dL      Hematocrit 42 6 %      MCV 89 fL      MCH 29 6 pg      MCHC 33 3 g/dL      RDW 12 8 %      MPV 10 3 fL      Platelets 520 Thousands/uL      nRBC 0 /100 WBCs      Neutrophils Relative 55 %      Immat GRANS % 0 %      Lymphocytes Relative 35 %      Monocytes Relative 9 %      Eosinophils Relative 1 %      Basophils Relative 0 %      Neutrophils Absolute 3 90 Thousands/µL      Immature Grans Absolute 0 01 Thousand/uL      Lymphocytes Absolute 2 49 Thousands/µL      Monocytes Absolute 0 67 Thousand/µL      Eosinophils Absolute 0 08 Thousand/µL      Basophils Absolute 0 03 Thousands/µL     Hemoglobin A1C [[id number]] Collected: 03/15/22 0910    Lab Status: In process Specimen: Blood from Arm, Right Updated: 03/15/22 0918    Fingerstick Glucose (POCT) [[id number]]  (Abnormal) Collected: 03/15/22 0854    Lab Status: Final result Updated: 03/15/22 0902     POC Glucose 289 mg/dl                  CTA head and neck with and without contrast   Final Result by Paul Pisano MD (03/15 1026)      1  No acute intracranial CT abnormality  2   Unremarkable CT angiogram of the head and neck  3   Mild right maxillary sinus disease  Workstation performed: [id number]         XR chest 1 view portable   Final Result by Freeman Ceja MD (03/15 1006)      No acute cardiopulmonary disease                    Workstation performed: [id number]                    Procedures  ECG 12 Lead Documentation Only    Date/Time: 3/15/2022 10:49 AM  Performed by: Lianne Burton DO  Authorized by: Lianne Burton DO     Patient location:  ED  Previous ECG:     Previous ECG: Unavailable  Interpretation:     Interpretation: normal    Rate:     ECG rate:  73    ECG rate assessment: normal    Rhythm:     Rhythm: sinus rhythm    Ectopy:     Ectopy: none    QRS:     QRS axis:  Normal    QRS intervals:  Normal  Conduction:     Conduction: normal    ST segments:     ST segments:  Normal  T waves:     T waves: normal               ED Course  ED Course as of 03/15/22 1240   Tue Mar 15, 2022   1025 Discussed case with Neurology as patient may have had TIA prior to arrival   Agree with plan for now  Will admit patient stroke pathway  HEART Risk Score      Most Recent Value   Heart Score Risk Calculator    History 0 Filed at: 03/15/2022 1050   ECG 0 Filed at: 03/15/2022 1050   Age 1 Filed at: 03/15/2022 1050   Risk Factors 0 Filed at: 03/15/2022 1050   Troponin 0 Filed at: 03/15/2022 1050   HEART Score 1 Filed at: 03/15/2022 1050           Stroke Assessment     Row Name 03/15/22 1213             NIH Stroke Scale    Interval Baseline      Level of Consciousness (1a ) 0      LOC Questions (1b ) 0      LOC Commands (1c ) 0      Best Gaze (2 ) 0      Visual (3 ) 0      Facial Palsy (4 ) 0      Motor Arm, Left (5a ) 0      Motor Arm, Right (5b ) 0      Motor Leg, Left (6a ) 0      Motor Leg, Right (6b ) 0      Limb Ataxia (7 ) 0      Sensory (8 ) 0      Best Language (9 ) 0      Dysarthria (10 ) 0      Extinction and Inattention (11 ) (Formerly Neglect) 0      Total 0                Most Recent Value   TPA Decision Options    TPA Decision Patient not a TPA candidate  Patient is not a candidate options Symptoms resolved/clearly non disabling  SBIRT 22yo+      Most Recent Value   SBIRT (24 yo +)    In order to provide better care to our patients, we are screening all of our patients for alcohol and drug use  Would it be okay to ask you these screening questions? Yes Filed at: 03/15/2022 4773   Initial Alcohol Screen: US AUDIT-C     1   How often do you have a drink containing alcohol? 0 Filed at: 03/15/2022 0905   2  How many drinks containing alcohol do you have on a typical day you are drinking? 0 Filed at: 03/15/2022 0905   3a  Male UNDER 65: How often do you have five or more drinks on one occasion? 0 Filed at: 03/15/2022 0905   3b  FEMALE Any Age, or MALE 65+: How often do you have 4 or more drinks on one occassion? 0 Filed at: 03/15/2022 0905   Audit-C Score 0 Filed at: 03/15/2022 1405   BRENDA: How many times in the past year have you    Used an illegal drug or used a prescription medication for non-medical reasons? Never Filed at: 03/15/2022 0905                    MDM  Number of Diagnoses or Management Options  Diabetes ([facility] [address] )  Hyperglycemia  TIA (transient ischemic attack)  Diagnosis management comments: Concern for possible TIA earlier today as patient had reported episode of a few minutes of slurring of speech with no other symptoms  Patient does have elevated blood glucose  Will check blood work, CTA head neck, EKG, troponin  Patient will likely require admission on stroke pathway  Discussed with neurology patient given atorvastatin and aspirin  Disposition  Final diagnoses:   Hyperglycemia   Diabetes ([facility] [address] )   TIA (transient ischemic attack)     Time reflects when diagnosis was documented in both MDM as applicable and the Disposition within this note     Time User Action Codes Description Comment    3/15/2022 12:07 PM Vergie Hamming Add [R73 9] Hyperglycemia     3/15/2022 12:07 PM Vergie Hamming Add [E11 9] Diabetes ([facility] [address] )     3/15/2022 12:07 PM Vergie Hamming Add [G45 9] TIA (transient ischemic attack)       ED Disposition     ED Disposition Condition Date/Time Comment    Admit Stable Tue Mar 15, 2022 12:07 PM Case was discussed with VALENTIN and the patient's admission status was agreed to be Admission Status: observation status to the service of Dr Mikhail Faustin          Follow-up Information    None         Patient's Medications   Discharge Prescriptions No medications on file       No discharge procedures on file      PDMP Review     None          ED Provider  Electronically Signed by           Stacy Beltre, DO  03/15/22 One Arnav Salcedo, DO  03/15/22 2813

## 2022-03-15 NOTE — H&P
[facility]  H&P- Nila Curtis 1965, 49 y o  male MRN: [id number]  Unit/Bed#: -02 Encounter: [id number]  Primary Care Provider: DEVON Avila   Date and time admitted to hospital: 3/15/2022  8:39 AM    * Hyperglycemia  Assessment & Plan  · Hemoglobin A1c of 7 8 on 11/2021  · This was found incidentally on his blood work while he was under hepatitis C treatment  · The patient has been checking his blood sugar at home and noticed that it has been between 400-500  Currently not on any medications  The patient did have some diet modifications and has lost almost 40 lb over the past few months  Has just established care with a new PCP and the appointment is not until April  · Will give 8 units of Lantus and Humalog 5 units now  · Continue with IV fluid  · No evidence of DKA, mild elevations of beta hydroxybutyrate- 1 1   · Pseudo hyponatremia due to hyperglycemia  · Started on SSI, meals coverage with lantus; pending repeat hemoglobin A1c and will need regimen for anti hyperglycemia on discharge and possible endocrinology follow-up    Stroke-like symptoms  Assessment & Plan  · The patient admits to visual disturbances, dizziness, generalized weakness which has been going on for the past 2-3 weeks  · Suspected that this could be related to hyperglycemia/newly diagnosed diabetes however will rule out TIA/CVA  · Stroke pathway  · Neurologic consult  · MRI brain  · Will hold off on echocardiogram for now  · Hemoglobin A1c, lipid panel      History of hepatitis C  Assessment & Plan  · With cirrhosis of the liver  · The patient follows with GI outpatient  · This was treated     VTE Prophylaxis: Enoxaparin (Lovenox)  Code Status:  Full code  POLST: POLST is not applicable to this patient  Discussion with family:  Wife at bedside    Anticipated Length of Stay:  Patient will be admitted on an Observation basis with an anticipated length of stay of  < 2 midnights     Justification for Hospital Stay:  Hyperglycemia    Chief Complaint:   Generalized weakness, visual disturbances, dizziness, with high blood sugar    History of Present Illness:    Aden Suarez is a 49 y o  male who presents with generalized weakness, visual disturbances, dizziness, and hyperglycemia  The patient past medical history of 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  The patient reports worsening generalized weakness, lightheadedness, intermittent headache and hyperglycemia over the past 2-3 weeks  The patient has been taking his blood glucose home after an incidental finding of hyperglycemia on his recent blood work while he was under treatment for hepatitis-C  His blood glucose has been ranging from 400-500 even fasting blood glucose  Wife reports that the patient does have intermittent confusion with some slurred speech but without any facial or extremity weakness  No prior history of TIA/stroke  Currently he denies any chest pain, dyspnea, vomiting, or abdominal pain  He has been trying to cut carbohydrates intake however without improvement of his blood glucose  Review of Systems:  Review of Systems   Constitutional: Positive for fatigue  Negative for chills and fever  HENT: Negative for congestion, ear pain, postnasal drip and sore throat  Eyes: Positive for visual disturbance  Negative for discharge and itching  Respiratory: Negative for cough, chest tightness and shortness of breath  Cardiovascular: Negative for chest pain, palpitations and leg swelling  Gastrointestinal: Negative for abdominal pain, nausea and vomiting  Endocrine: Negative for cold intolerance and heat intolerance  Genitourinary: Negative for difficulty urinating, dysuria and hematuria  Musculoskeletal: Positive for gait problem  Negative for back pain and neck pain  Skin: Negative for rash and wound  Neurological: Positive for dizziness and weakness   Negative for speech difficulty, light-headedness and headaches  Psychiatric/Behavioral: Negative for behavioral problems, confusion and decreased concentration  Past Medical and Surgical History:   Past Medical History:   Diagnosis Date    Cirrhosis ([facility] [address] )     Hepatitis C virus infection cured after antiviral drug therapy        History reviewed  No pertinent surgical history  Meds/Allergies:  Prior to Admission medications    Medication Sig Start Date End Date Taking? Authorizing Provider   ascorbic acid (VITAMIN C) 500 MG tablet Take 500 mg by mouth 2 (two) times a day    Historical Provider, MD   ergocalciferol (ERGOCALCIFEROL) 1 25 MG (57123 UT) capsule Take 50,000 Units by mouth    Historical Provider, MD   Garlic 807 MG CAPS Take by mouth    Historical Provider, MD   Multiple Vitamin (MULTIVITAMIN ADULT PO) Take 1 tablet by mouth daily    Historical Provider, MD   OMEGA-3 FATTY ACIDS PO Take by mouth    Historical Provider, MD     I have reviewed home medications with patient personally      Allergies: No Known Allergies    Social History:  Marital Status: /Civil Union   Occupation:  [employer]  Patient Pre-hospital Living Situation:  Family  Patient Pre-hospital Level of Mobility:  Independent  Patient Pre-hospital Diet Restrictions:  None  Substance Use History:   Social History     Substance and Sexual Activity   Alcohol Use Not Currently     Social History     Tobacco Use   Smoking Status Former Smoker    Quit date:     Years since quittin 2   Smokeless Tobacco Never Used     Social History     Substance and Sexual Activity   Drug Use Not Currently       Family History:  I have reviewed the patients family history    Physical Exam:   Vitals:   Blood Pressure: 119/84 (03/15/22 1450)  Pulse: 62 (03/15/22 1450)  Temperature: (!) 97 3 °F (36 3 °C) (03/15/22 1450)  Temp Source: Oral (03/15/22 1450)  Respirations: 19 (03/15/22 1450)  Height: 5' 11 5" (181 6 cm) (03/15/22 0843)  Weight - Scale: 76 6 kg (168 lb 14 oz) (03/15/22 0843)  SpO2: 96 % (03/15/22 6203)    Physical Exam  Vitals and nursing note reviewed  Constitutional:       General: He is not in acute distress  Appearance: Normal appearance  HENT:      Head: Normocephalic and atraumatic  Right Ear: External ear normal       Left Ear: External ear normal       Nose: Nose normal       Mouth/Throat:      Mouth: Mucous membranes are moist       Pharynx: Oropharynx is clear  Eyes:      General:         Right eye: No discharge  Left eye: No discharge  Extraocular Movements: Extraocular movements intact  Pupils: Pupils are equal, round, and reactive to light  Cardiovascular:      Rate and Rhythm: Normal rate and regular rhythm  Pulses: Normal pulses  Heart sounds: Normal heart sounds  No murmur heard  Pulmonary:      Effort: Pulmonary effort is normal  No respiratory distress  Breath sounds: Normal breath sounds  No wheezing or rales  Abdominal:      General: Bowel sounds are normal  There is no distension  Palpations: Abdomen is soft  There is no mass  Tenderness: There is no abdominal tenderness  Musculoskeletal:         General: No swelling, tenderness or deformity  Normal range of motion  Cervical back: Normal range of motion and neck supple  No rigidity  Skin:     General: Skin is warm and dry  Capillary Refill: Capillary refill takes less than 2 seconds  Coloration: Skin is not pale  Findings: No erythema  Neurological:      General: No focal deficit present  Mental Status: He is alert and oriented to person, place, and time  Mental status is at baseline  Comments: +blurry vision without any visual deficits    Psychiatric:         Mood and Affect: Mood normal          Behavior: Behavior normal          Thought Content: Thought content normal          Judgment: Judgment normal          Additional Data:   Lab Results: I have personally reviewed pertinent reports      Results from last 7 days   Lab Units 03/15/22  0910   WBC Thousand/uL 7 18   HEMOGLOBIN g/dL 14 2   HEMATOCRIT % 42 6   PLATELETS Thousands/uL 219   NEUTROS PCT % 55   LYMPHS PCT % 35   MONOS PCT % 9   EOS PCT % 1     Results from last 7 days   Lab Units 03/15/22  0910   SODIUM mmol/L 130*   POTASSIUM mmol/L 4 0   CHLORIDE mmol/L 96   CO2 mmol/L 24   BUN mg/dL 28*   CREATININE mg/dL 0 84   CALCIUM mg/dL 9 6     Results from last 7 days   Lab Units 03/15/22  0910   INR  0 99     Results from last 7 days   Lab Units 03/15/22  0854   POC GLUCOSE mg/dl 289*           Imaging: I have personally reviewed pertinent reports  CTA head and neck with and without contrast   Final Result by Emily Hester MD (03/15 1026)      1  No acute intracranial CT abnormality  2   Unremarkable CT angiogram of the head and neck  3   Mild right maxillary sinus disease  Workstation performed: [id number]         XR chest 1 view portable   Final Result by Anthony Cardona MD (03/15 1006)      No acute cardiopulmonary disease  Workstation performed: [id number]         MRI Inpatient Order    (Results Pending)       EKG, Pathology, and Other Studies Reviewed on Admission:   · EKG:  Normal sinus rhythm heart rate 73    Epic Records Reviewed: Yes     ** Please Note: This note has been constructed using a voice recognition system   **

## 2022-03-16 VITALS
DIASTOLIC BLOOD PRESSURE: 76 MMHG | SYSTOLIC BLOOD PRESSURE: 105 MMHG | WEIGHT: 168.87 LBS | HEIGHT: 72 IN | OXYGEN SATURATION: 95 % | BODY MASS INDEX: 22.87 KG/M2 | RESPIRATION RATE: 18 BRPM | TEMPERATURE: 98.2 F | HEART RATE: 70 BPM

## 2022-03-16 PROBLEM — E78.5 HYPERLIPIDEMIA: Status: ACTIVE | Noted: 2022-03-16

## 2022-03-16 LAB
ALBUMIN SERPL BCP-MCNC: 3.7 G/DL (ref 3.5–5)
ALP SERPL-CCNC: 65 U/L (ref 34–104)
ALT SERPL W P-5'-P-CCNC: 23 U/L (ref 7–52)
ANION GAP SERPL CALCULATED.3IONS-SCNC: 8 MMOL/L (ref 4–13)
AST SERPL W P-5'-P-CCNC: 26 U/L (ref 13–39)
BILIRUB SERPL-MCNC: 0.41 MG/DL (ref 0.2–1)
BUN SERPL-MCNC: 15 MG/DL (ref 5–25)
CALCIUM SERPL-MCNC: 9 MG/DL (ref 8.4–10.2)
CHLORIDE SERPL-SCNC: 101 MMOL/L (ref 96–108)
CHOLEST SERPL-MCNC: 221 MG/DL
CO2 SERPL-SCNC: 27 MMOL/L (ref 21–32)
CREAT SERPL-MCNC: 0.68 MG/DL (ref 0.6–1.3)
ERYTHROCYTE [DISTWIDTH] IN BLOOD BY AUTOMATED COUNT: 13.2 % (ref 11.6–15.1)
GFR SERPL CREATININE-BSD FRML MDRD: 108 ML/MIN/1.73SQ M
GLUCOSE P FAST SERPL-MCNC: 262 MG/DL (ref 65–99)
GLUCOSE SERPL-MCNC: 195 MG/DL (ref 65–140)
GLUCOSE SERPL-MCNC: 219 MG/DL (ref 65–140)
GLUCOSE SERPL-MCNC: 262 MG/DL (ref 65–140)
GLUCOSE SERPL-MCNC: 278 MG/DL (ref 65–140)
HCT VFR BLD AUTO: 41.7 % (ref 36.5–49.3)
HDLC SERPL-MCNC: 48 MG/DL
HGB BLD-MCNC: 14.2 G/DL (ref 12–17)
LDLC SERPL CALC-MCNC: 154 MG/DL (ref 0–100)
MAGNESIUM SERPL-MCNC: 2 MG/DL (ref 1.9–2.7)
MCH RBC QN AUTO: 30.4 PG (ref 26.8–34.3)
MCHC RBC AUTO-ENTMCNC: 34.1 G/DL (ref 31.4–37.4)
MCV RBC AUTO: 89 FL (ref 82–98)
PHOSPHATE SERPL-MCNC: 3.1 MG/DL (ref 2.7–4.5)
PLATELET # BLD AUTO: 206 THOUSANDS/UL (ref 149–390)
PMV BLD AUTO: 9.9 FL (ref 8.9–12.7)
POTASSIUM SERPL-SCNC: 3.9 MMOL/L (ref 3.5–5.3)
PROT SERPL-MCNC: 6.6 G/DL (ref 6.4–8.4)
RBC # BLD AUTO: 4.67 MILLION/UL (ref 3.88–5.62)
SODIUM SERPL-SCNC: 136 MMOL/L (ref 135–147)
TRIGL SERPL-MCNC: 95 MG/DL
WBC # BLD AUTO: 5.37 THOUSAND/UL (ref 4.31–10.16)

## 2022-03-16 PROCEDURE — 85027 COMPLETE CBC AUTOMATED: CPT | Performed by: NURSE PRACTITIONER

## 2022-03-16 PROCEDURE — 84100 ASSAY OF PHOSPHORUS: CPT | Performed by: NURSE PRACTITIONER

## 2022-03-16 PROCEDURE — 82948 REAGENT STRIP/BLOOD GLUCOSE: CPT

## 2022-03-16 PROCEDURE — 80061 LIPID PANEL: CPT | Performed by: NURSE PRACTITIONER

## 2022-03-16 PROCEDURE — 80053 COMPREHEN METABOLIC PANEL: CPT | Performed by: NURSE PRACTITIONER

## 2022-03-16 PROCEDURE — 97161 PT EVAL LOW COMPLEX 20 MIN: CPT

## 2022-03-16 PROCEDURE — 83735 ASSAY OF MAGNESIUM: CPT | Performed by: NURSE PRACTITIONER

## 2022-03-16 PROCEDURE — 99217 PR OBSERVATION CARE DISCHARGE MANAGEMENT: CPT | Performed by: NURSE PRACTITIONER

## 2022-03-16 RX ORDER — ATORVASTATIN CALCIUM 40 MG/1
40 TABLET, FILM COATED ORAL
Qty: 60 TABLET | Refills: 0 | Status: SHIPPED | OUTPATIENT
Start: 2022-03-16

## 2022-03-16 RX ADMIN — OXYCODONE HYDROCHLORIDE AND ACETAMINOPHEN 500 MG: 500 TABLET ORAL at 09:40

## 2022-03-16 RX ADMIN — INSULIN LISPRO 3 UNITS: 100 INJECTION, SOLUTION INTRAVENOUS; SUBCUTANEOUS at 11:58

## 2022-03-16 RX ADMIN — INSULIN LISPRO 2 UNITS: 100 INJECTION, SOLUTION INTRAVENOUS; SUBCUTANEOUS at 11:57

## 2022-03-16 RX ADMIN — ASPIRIN 81 MG CHEWABLE TABLET 81 MG: 81 TABLET CHEWABLE at 09:39

## 2022-03-16 RX ADMIN — INSULIN GLARGINE 8 UNITS: 100 INJECTION, SOLUTION SUBCUTANEOUS at 09:39

## 2022-03-16 RX ADMIN — INSULIN LISPRO 3 UNITS: 100 INJECTION, SOLUTION INTRAVENOUS; SUBCUTANEOUS at 09:38

## 2022-03-16 RX ADMIN — Medication 1 TABLET: at 09:40

## 2022-03-16 RX ADMIN — INSULIN LISPRO 4 UNITS: 100 INJECTION, SOLUTION INTRAVENOUS; SUBCUTANEOUS at 09:38

## 2022-03-16 RX ADMIN — SODIUM CHLORIDE, SODIUM GLUCONATE, SODIUM ACETATE, POTASSIUM CHLORIDE, MAGNESIUM CHLORIDE, SODIUM PHOSPHATE, DIBASIC, AND POTASSIUM PHOSPHATE 100 ML/HR: .53; .5; .37; .037; .03; .012; .00082 INJECTION, SOLUTION INTRAVENOUS at 06:13

## 2022-03-16 NOTE — DISCHARGE SUMMARY
[address] [address]  Discharge- Mikhail Sal 1965, 49 y o  male MRN: [id number]  Unit/Bed#: -01 Encounter: [id number]  Primary Care Provider: DEVON Javier   Date and time admitted to hospital: 3/15/2022  8:39 AM    * Hyperglycemia  Assessment & Plan  · Hemoglobin A1c of 7 8 on 11/2021  · This was found incidentally on his blood work while he was under hepatitis C treatment  · The patient has been checking his blood sugar at home and noticed that it has been between 400-500  Currently not on any medications  The patient did have some diet modifications and has lost almost 40 lb over the past few months  Has just established care with a new PCP and the appointment is not until April  · Will give 8 units of Lantus and Humalog 5 units now  · Continue with IV fluid  · No evidence of DKA, mild elevations of beta hydroxybutyrate- 1 1   · Pseudo hyponatremia due to hyperglycemia  · Started on SSI, meals coverage with lantus; pending repeat hemoglobin A1c and will need regimen for anti hyperglycemia on discharge and possible endocrinology follow-up  · Patient will not had health insurance benefits until April 1st   Discussed with attending, will start with oral hypoglycemics  Discussed with patient, he is in agreement  He will follow-up with primary care the 1st week of April  His wife is trying to move it forward  He has an eye appointment for his blurry vision April 2nd      Stroke-like symptoms  Assessment & Plan  · The patient admits to visual disturbances, dizziness, generalized weakness which has been going on for the past 2-3 weeks  · Suspected that this could be related to hyperglycemia/newly diagnosed diabetes however will rule out TIA/CVA  · Stroke pathway  · Will hold off on inpatient Neurology consult as suspect symptoms are due to hyperglycemia as imaging is unremarkable, NIH stroke scale 0  · Will hold off on echocardiogram for now  · Hemoglobin A1c >14, lipid panel elevated cholesterol and LDL      Hyperlipidemia  Assessment & Plan  · Cholesterol and LDL elevated  · Patient should continue with dietary changes including exercise and weight loss as he has been doing  · Continue atorvastatin    History of hepatitis C  Assessment & Plan  · With cirrhosis of the liver  · The patient follows with GI outpatient  · This was treated     Discharging Physician / Practitioner: Marilyn Dear, [facility]  PCP: Jaclny Haney  Admission Date:   Admission Orders (From admission, onward)     Ordered        03/15/22 1204  Place in Observation  Once                      Discharge Date: 03/16/22    Medical Problems             Resolved Problems  Date Reviewed: 3/15/2022    None                Consultations During Hospital Stay:  · Speech pathology, Physical therapy, case management, occupational therapy    Procedures Performed:   · None    Significant Findings / Test Results:   · Hemoglobin A1c >14, elevated cholesterol and LDL    Incidental Findings:   · None     Test Results Pending at Discharge (will require follow up): · None     Outpatient Tests Requested:  · None    Complications:  None apparent    Reason for Admission:  High blood sugar    Hospital Course:     Soo Nice is a 49 y o  male patient who originally presented to the hospital on 3/15/2022 due to high blood sugar  He also reported visual disturbance for several weeks, generalized weakness and feeling unwell for about 3 months, and also had polydipsia and polyuria  He had obtained a blood glucose monitoring kit and says that his sugars have been running 3-400 and at times he has recorded over 500  He was originally admitted on the stroke pathway, however imaging was negative and symptoms are most likely due to his hyperglycemia  He was started on a sliding scale and now his glucose is ranging in the 200s  He says he feels much better    He has insurance starting on April 1st, so he will be discharged on oral hypoglycemic agents  Extensive education was given to patient and he verbalized understanding regarding diet choices, exercise, medication, and recommended follow-up  Please see above list of diagnoses and related plan for additional information  Condition at Discharge: stable     Discharge Day Visit / Exam:     Subjective:  Patient seen and examined  He says he feels a lot better since he came in  Vitals: Blood Pressure: 105/76 (03/16/22 1130)  Pulse: 70 (03/16/22 1130)  Temperature: 98 2 °F (36 8 °C) (03/16/22 1130)  Temp Source: Temporal (03/16/22 0751)  Respirations: 18 (03/16/22 1130)  Height: 5' 11 5" (181 6 cm) (03/15/22 0843)  Weight - Scale: 76 6 kg (168 lb 14 oz) (03/15/22 0843)  SpO2: 95 % (03/16/22 1130)    Exam:   Physical Exam  Vitals and nursing note reviewed  HENT:      Head: Normocephalic and atraumatic  Mouth/Throat:      Pharynx: Oropharynx is clear  Eyes:      Pupils: Pupils are equal, round, and reactive to light  Cardiovascular:      Rate and Rhythm: Normal rate  Pulmonary:      Effort: Pulmonary effort is normal  No respiratory distress  Breath sounds: Normal breath sounds  Abdominal:      General: Bowel sounds are normal       Palpations: Abdomen is soft  Tenderness: There is no abdominal tenderness  Musculoskeletal:      Cervical back: Neck supple  Skin:     General: Skin is warm and dry  Capillary Refill: Capillary refill takes less than 2 seconds  Neurological:      General: No focal deficit present  Mental Status: He is alert  Discussion with Family:  Of care and follow-up discussed with patient, he said he would discuss with wife    Discharge instructions/Information to patient and family:   See after visit summary for information provided to patient and family  Provisions for Follow-Up Care:  See after visit summary for information related to follow-up care and any pertinent home health orders        Disposition: Home    Planned Readmission:  No     Discharge Statement:  I spent 30 minutes discharging the patient  This time was spent on the day of discharge  I had direct contact with the patient on the day of discharge  Greater than 50% of the total time was spent examining patient, answering all patient questions, arranging and discussing plan of care with patient as well as directly providing post-discharge instructions  Additional time then spent on discharge activities  Discharge Medications:  See after visit summary for reconciled discharge medications provided to patient and family        ** Please Note: This note has been constructed using a voice recognition system **

## 2022-03-16 NOTE — NUTRITION
03/16/22 1341   Biochemical Data,Medical Tests, and Procedures   Biochemical Data/Medical Tests/Procedures Lab values reviewed; Meds reviewed   Labs (Comment) A1C 13 4 %  cholesterol 221   Meds (Comment) Insulin MVI  VIT C lipitor asa   Nutrition-Focused Physical Exam   Nutrition-Focused Physical Exam Findings No edema documented; No skin issues documented   Nutrition-Focused Physical Exam Findings thin LE   Medical-Related Concerns new onset DM   Adequacy of Intake   Nutrition Modality PO   Feeding Route   PO Independent   Current PO Intake   Current Diet Order CCD2   Current Meal Intake %; Adequate   Estimated calorie intake compared to estimated need good appetite, anticipate will meet minimal needs   PES Statement   Problem Clinical   Biochemical (2) Altered nutrition-related laboratory values (specify) NC-2 2   Related to Other (Comment)  (altered endocrine function)   As evidenced by: Abnormal lab (Specify)  (A1C 13 4%)   Recommendations/Interventions   Malnutrition/BMI Present   (does not meet criteria)   Summary Consult for new onset DM, A1C 13 4%  Pt verbalizes incorrect information for controlling BG  Diabetes nutrition education provided with handouts, also for increased LDL  Recommend adjust diet to CCD2 double protein to meet needs     Interventions/Recommendations Adjust diet order;Initiate diet   Intervention Comments recommend double protein portions to meet nutrient needs   Education Assessment   Education Education initiated/ completed   Education Notes DM education, decreased saturated fats   Patient Nutrition Goals   Goal Comprehend education;Meet PO needs   Goal Status Initiated   Timeframe to complete goal by d/c   Nutrition Complexity Risk   Nutrition complexity level Low risk   Follow up date 03/23/22

## 2022-03-16 NOTE — DISCHARGE INSTRUCTIONS
Diabetic Hyperglycemia   WHAT YOU NEED TO KNOW:   Diabetic hyperglycemia is a blood glucose (sugar) level that is higher than your diabetes care team provider recommends  You may have increased thirst and urinate more often than usual    DISCHARGE INSTRUCTIONS:   Call 911 for any of the following:   · You have a seizure  · You begin to breathe fast or are short of breath  · You become weak and confused  Return to the emergency department if:   · Your blood sugar level is over 240 mg/dL and  you have ketones in your urine  · Your breath smells fruity  · You have nausea and are vomiting  · You have symptoms of dehydration, such as dark yellow urine, dry mouth and lips, and dry skin  Call your care team provider if:   · You continue to have higher blood sugar levels than your care team provider recommends  · You have questions or concerns about your condition or care  Medicines:   · Medicines , such as insulin and diabetes pills, decrease blood sugar levels  · Take your medicine as directed  Contact your healthcare provider if you think your medicine is not helping or if you have side effects  Tell him or her if you are allergic to any medicine  Keep a list of the medicines, vitamins, and herbs you take  Include the amounts, and when and why you take them  Bring the list or the pill bottles to follow-up visits  Carry your medicine list with you in case of an emergency  Manage diabetic hyperglycemia:   · If you take diabetes medicine or insulin, take it as directed  Missed or wrong doses can cause your blood sugar to go up  · Tell your care team provider if you continue to have trouble managing your blood sugar  He or she may change the type, amount, or timing of your diabetes medicine or insulin  If you do not take diabetes medicine or insulin, you may need to start  · Work with your care team provider to develop a sick day plan    Illness can cause your blood sugar to rise  A sick day plan helps you control your blood sugar level when you are sick  Prevent diabetic hyperglycemia:   · Check your blood sugar levels regularly  Ask your care team provider how often to check your blood sugar and what your levels should be  · Follow your meal plan  Your blood sugar can go up if you eat a large meal or you eat more carbohydrates than recommended  Work with a dietitian to develop a meal plan that is right for you  · Exercise as directed  Physical activity, such as exercise, can help lower your blood sugar when it is high  It can also keep your blood sugar levels steady over time  Be active for at least 30 minutes, 5 days a week  Include muscle strengthening activities 2 days each week  Do not sit for longer than 30 minutes at a time  Work with your care team provider to create an activity plan  Children should get at least 60 minutes of physical activity each day  · Check your ketones before exercise  if your blood sugar level is above 240 mg/dL  Do not exercise if you have ketones in your urine  because your blood sugar level may rise even more  Ask your healthcare provider how to lower your blood sugar when you have ketones  Follow up with your care team provider as directed: Your care team provider may refer you to a dietitian  He or she can help you manage your blood sugar levels  Write down your questions so you remember to ask them during your visits  © Copyright [employer] 2022 Information is for End User's use only and may not be sold, redistributed or otherwise used for commercial purposes  All illustrations and images included in CareNotes® are the copyrighted property of [employer]  or [facility] [employer] Ross   The above information is an  only  It is not intended as medical advice for individual conditions or treatments   Talk to your doctor, nurse or pharmacist before following any medical regimen to see if it is safe and effective for you     Hyperlipidemia   WHAT YOU NEED TO KNOW:   What is hyperlipidemia? Hyperlipidemia is a high level of lipids (fats) in your blood  These lipids include cholesterol or triglycerides  Lipids are made by your body  They also come from the foods you eat  Your body needs lipids to work properly, but high levels increase your risk for heart disease, heart attack, and stroke  What increases my risk for hyperlipidemia? · Family history of high lipid levels    · Diet high in saturated fats, cholesterol, or calories    · High alcohol intake or smoking    · Lack of regular physical activity    · Medical conditions such as hypothyroidism, obesity, or type 2 diabetes    · Certain medicines, such as blood pressure medicines, hormones, and steroids    How is hyperlipidemia managed and treated? Your healthcare provider may first recommend that you make lifestyle changes to help decrease your lipid levels  Your provider may recommend you work with a team to manage hyperlipidemia  The team may include medical experts such as a dietitian, an exercise or physical therapist, and a behavior therapist  Your family members may be included in helping you create lifestyle changes  You may also need to take medicine to lower your lipid levels  Some of the lifestyle changes you may need to make include the following:  · Maintain a healthy weight  Ask your healthcare provider what a healthy weight is for you  Ask him or her to help you create a weight loss plan if you are overweight  Weight loss can decrease your cholesterol and triglyceride levels  · Be physically active throughout the day  Physical activity, such as exercise, lowers your cholesterol levels and helps you maintain a healthy weight  Get 30 minutes or more of aerobic exercise 4 to 6 days each week  You can split your exercise into four 10-minute workouts instead of 30 minutes at one time   Examples of aerobic exercises include walking briskly, swimming, or riding a bike  Work with your healthcare provider to plan the best exercise program for you  Also include strength training at least 2 times each week  Your healthcare providers can help you create a physical activity plan  · Do not smoke  Nicotine and other chemicals in cigarettes and cigars can increase your risk for a heart attack and stroke  Ask your healthcare provider for information if you currently smoke and need help to quit  E-cigarettes or smokeless tobacco still contain nicotine  Talk to your healthcare provider before you use these products  · Eat heart-healthy foods  A dietitian or your provider can give you more information on low-sodium plans or the DASH (Dietary Approaches to Stop Hypertension) eating plan  The DASH plan is low in sodium, processed sugar, unhealthy fats, and total fat  It is high in potassium, calcium, and fiber  It is high in potassium, calcium, and fiber  These can be found in vegetables, fruit, and whole-grain foods  The following are ways to get more heart-healthy foods:         ? Decrease the total amount of fat you eat  Choose lean meats, fat-free or 1% fat milk, and low-fat dairy products, such as yogurt and cheese  Limit or do not eat red meat  Red meats are high in fat and cholesterol  ? Replace unhealthy fats with healthy fats  Unhealthy fats include saturated fat, trans fat, and cholesterol  Choose soft margarines that are low in saturated fat and have little or no trans fat  Monounsaturated fats are healthy fats  These are found in olive oil, canola oil, avocado, and nuts  Polyunsaturated fats are also healthy  These are found in fish, flaxseed, walnuts, and soybeans  ? Eat 5 or more servings of fruits and vegetables every day  They are low in calories and fat, and a good source of essential vitamins  Include dark green, red, and orange vegetables  Examples include spinach, kale, broccoli, and carrots  ? Eat foods high in fiber    Fiber can help lower your cholesterol levels  Choose whole grain, high-fiber foods  Good choices include whole-wheat breads or cereals, beans, peas, fruits, and vegetables  ? Limit sodium (salt) as directed  Too much sodium can affect your fluid balance and blood pressure  Your healthcare provider will tell you how much sodium and potassium are safe for you to have in a day  He or she may recommend that you limit sodium to 2,300 mg a day  Your provider or a dietitian can help you find ways to limit sodium  For example, if you add salt while you cook, do not add more salt at the table  Check labels to find low-sodium or no-salt-added foods  Some low-sodium foods use potassium salts for flavor  Too much potassium can also cause health problems  · Ask your healthcare provider if it is okay for you to drink alcohol  Alcohol can increase your cholesterol and triglyceride levels  Your provider can tell you how many drinks are okay to have within 24 hours and within 1 week  A drink of alcohol is 12 ounces of beer, 5 ounces of wine, or 1½ ounces of liquor  Call your local emergency number [phone] in the [address] [place] [address],[address]) or have someone call if:   · You have any of the following signs of a heart attack:      ? Squeezing, pressure, or pain in your chest    ? You may  also have any of the following:     § Discomfort or pain in your back, neck, jaw, stomach, or arm    § Shortness of breath    § Nausea or vomiting    § Lightheadedness or a sudden cold sweat    · You have any of the following signs of a stroke:      ? Numbness or drooping on one side of your face     ? Weakness in an arm or leg    ? Confusion or difficulty speaking    ? Dizziness, a severe headache, or vision loss    When should I call my doctor? · You have questions or concerns about your condition or care  CARE AGREEMENT:   You have the right to help plan your care  Learn about your health condition and how it may be treated   Discuss treatment options with your healthcare providers to decide what care you want to receive  You always have the right to refuse treatment  The above information is an  only  It is not intended as medical advice for individual conditions or treatments  Talk to your doctor, nurse or pharmacist before following any medical regimen to see if it is safe and effective for you  © Copyright [employer] 2022 Information is for End User's use only and may not be sold, redistributed or otherwise used for commercial purposes  All illustrations and images included in CareNotes® are the copyrighted property of [employer] A M , Inc  or [facility] [employer] Ross       Metformin (By mouth)   Metformin Hydrochloride (met-FOR-min annemarie-droe-KLOR-sancho)  Treats type 2 diabetes  Brand Name(s): DM2, Fortamet, Glucophage, Glucophage XR, Glumetza, Riomet   There may be other brand names for this medicine  When This Medicine Should Not Be Used: This medicine is not right for everyone  Do not use if you had an allergic reaction to metformin  How to Use This Medicine:   Liquid, Long Acting Suspension, Tablet, Long Acting Tablet, 24 Hour Tablet  · Take your medicine as directed  Your dose may need to be changed several times to find what works best for you  · It is best to take this medicine with food or milk  · Swallow the extended-release tablet whole  Do not crush, break, or chew it  Tell your doctor if you have trouble swallowing the tablets whole  · Oral liquid: Measure the oral liquid medicine with a marked measuring spoon, oral syringe, or medicine cup  · Extended-release oral suspension: Use the supplied dosing cup to measure the mixed medicine  Ask your pharmacist for a dosing cup if you do not have one  · Read and follow the patient instructions that come with this medicine  Talk to your doctor or pharmacist if you have any questions  · Missed dose: Take a dose as soon as you remember   If it is almost time for your next dose, wait until then and take a regular dose  Do not take extra medicine to make up for a missed dose  · Store the medicine in a closed container at room temperature, away from heat, moisture, and direct light  Drugs and Foods to Avoid:   Ask your doctor or pharmacist before using any other medicine, including over-the-counter medicines, vitamins, and herbal products  · Some medicines can affect how metformin works  Tell your doctor if you are using any of the following:  ? Acetazolamide, cimetidine, dichlorphenamide, dolutegravir, isoniazid, nicotinic acid, phenytoin, ranolazine, topiramate, vandetanib, zonisamide  ? Birth control pills  ? Blood pressure medicine  ? Diuretic (water pill)  ? Phenothiazine medicine  ? Steroid medicine  ? Thyroid medicine  · Do not drink alcohol while you are using this medicine  Warnings While Using This Medicine:   · Tell your doctor if you are pregnant or breastfeeding, or if you have kidney disease, liver disease, heart or blood vessel disease, heart failure, blood circulation problems, anemia, metabolic acidosis, an adrenal gland or pituitary gland disorder, vitamin B12 deficiency, or had a heart attack  Tell your doctor if you drink alcohol  · Too much of this medicine can cause a rare, but serious condition called lactic acidosis  · Part of the extended-release tablet may pass in your stool  This is normal   · Make sure any doctor or dentist who treats you knows that you are using this medicine  You may need to stop using this medicine before you have surgery, an x-ray, CT scan, or other medical test   · This medicine may cause some premenopausal women who do not have regular monthly periods to ovulate  This can increase the chance of pregnancy  If you are a woman of childbearing potential, discuss birth control options with your doctor  · Your doctor will do lab tests at regular visits to check on the effects of this medicine  Keep all appointments  · Keep all medicine out of the reach of children  Never share your medicine with anyone  Possible Side Effects While Using This Medicine:   Call your doctor right away if you notice any of these side effects:  · Allergic reaction: Itching or hives, swelling in your face or hands, swelling or tingling in your mouth or throat, chest tightness, trouble breathing  · Confusion, fast heartbeat, increased hunger, shakiness  · Fever or chills  · Stomach pain, nausea, vomiting, muscle pain or cramping  · Trouble breathing, slow heartbeat, lightheadedness, dizziness  · Unusual tiredness or weakness  If you notice these less serious side effects, talk with your doctor:   · Diarrhea, gas  · Metallic taste in mouth  If you notice other side effects that you think are caused by this medicine, tell your doctor  Call your doctor for medical advice about side effects  You may report side effects to FDA at 3-949-FDA-5578    © Copyright [employer] 2022 Information is for End User's use only and may not be sold, redistributed or otherwise used for commercial purposes  The above information is an  only  It is not intended as medical advice for individual conditions or treatments  Talk to your doctor, nurse or pharmacist before following any medical regimen to see if it is safe and effective for you

## 2022-03-16 NOTE — PHYSICAL THERAPY NOTE
Physical Therapy Evaluation   Time in: 0812  Time out: 0822  Total evaluation time: 10 minutes         03/16/22 0813   PT Last Visit   PT Visit Date 03/16/22   Note Type   Note type Evaluation   Pain Assessment   Pain Assessment Tool 0-10   Pain Score No Pain   Restrictions/Precautions   Weight Bearing Precautions Per Order No   Other Precautions Telemetry; Fall Risk;Visual impairment   Home Living   Type of Home Mobile home   Home Layout Performs ADLs on one level; Able to live on main level with bedroom/bathroom;Stairs to enter with rails  (4 ANITHA to get inside )   Bathroom Shower/Tub Tub/shower unit   [employer] Raised   Bathroom Equipment Other (Comment)  (no bathroom equipment at baseline )   [address] Other (Comment)  (no AD at baseline )   Prior Function   Level of [place] Independent with ADLs and functional mobility   Lives With Spouse   Receives Help From Family   ADL Assistance Independent   IADLs Independent   Falls in the last 6 months 1 to 4  (pt reported one fall )   Vocational Full time employment   General   Family/Caregiver Present No   Cognition   Overall Cognitive Status WFL   Arousal/Participation Alert   Orientation Level Oriented X4   Memory Within functional limits   Following Commands Follows one step commands without difficulty   Comments pt agreeable to therapy    Subjective   Subjective "I feel fine with walking"    Strength RLE   R Hip Flexion 4/5  (observed 4/5)   R Knee Extension 4/5  (observed 4/5)   R Ankle Dorsiflexion 4/5  (observed 4/5)   Strength LLE   L Hip Flexion 4/5  (observed 4/5)   L Knee Extension 4/5  (observed 4/5)   L Ankle Dorsiflexion 4/5  (observed 4/5)   Vision-Basic Assessment   Current Vision Wears glasses all the time   Visual History Other (Comment)  (increased blurred vision in both eyes )   Coordination   Sensation WFL   Bed Mobility   Supine to Sit 7  Independent   Sit to Supine 7  Independent   Additional Comments Pt denied dizziness/lightheaded    Transfers   Sit to Stand 7  Independent   Stand to Sit 7  Independent   Ambulation/Elevation   Gait pattern Improper Weight shift;Decreased foot clearance   Gait Assistance 7  Independent   Assistive Device None   Distance 25 ft    Stair Management Assistance Not tested   Ambulation/Elevation Additional Comments Pt verbalized that when he walks, he feels that his vision makes him feel off  Balance   Static Sitting Normal   Dynamic Sitting Normal   Static Standing Good   Dynamic Standing Good   Ambulatory Good   Endurance Deficit   Endurance Deficit No   Activity Tolerance   Activity Tolerance Patient tolerated treatment well   Nurse Made Aware Yes, RN made aware    Assessment   Prognosis Good   Problem List Decreased strength   Assessment Pt is a 48 y/o male admitted to [place] [address] on 3/16/2022 for hyperglycemia  PT was ordered, "PT eval and tx" for a low-complexity eval  Pt's PMH includes history of hepatitis c and stroke like symptoms  There are no personal factors affecting the pt at this time  Upon PT arrival, the pt presented with decreased strength  Pt lives in a mobile home with bedroom and bathroom on main floor and 4 ANITHA to enter, lives with spouse, uses no AD at baseline, and is (I) for ADL's and IADL's  During today's PT eval, the pt performed bed mobility, transfers, and ambulation (I)  At this time, the pt will not benefit from further skilled therapy during his current hospital stay  Please re-consult therapy if pt's medical status changes  Upon pt d/c, PT recommends no rehab needs      Barriers to Discharge None   Goals   Patient Goals to go home    PT Treatment Day 0   Recommendation   PT Discharge Recommendation No rehabilitation needs   AM-PAC Basic Mobility Inpatient   Turning in Bed Without Bedrails 4   Lying on Back to Sitting on Edge of Flat Bed 4   Moving Bed to Chair 4   Standing Up From Chair 4   Walk in Room 4   Climb 3-5 Stairs 4 Basic Mobility Inpatient Raw Score 24   Basic Mobility Standardized Score 57 68   Highest Level Of Mobility   JH-HLM Goal 8: Walk 250 feet or more   JH-HLM Highest Level of Mobility 7: Walk 25 feet or more   JH-HLM Goal Achieved No   End of Consult   Patient Position at End of Consult Supine; All needs within reach       Patient's Name: Alexander Flores    Admitting Diagnosis  TIA (transient ischemic attack) [G45 9]  Diabetes ([facility] ) [E11 9]  Blood sugar increased [R73 09]  Hyperglycemia [R73 9]    Problem List  Patient Active Problem List   Diagnosis    Hyperglycemia    History of hepatitis C    Stroke-like symptoms       Past Medical History  Past Medical History:   Diagnosis Date    Cirrhosis ([facility] )     Hepatitis C virus infection cured after antiviral drug therapy        Past Surgical History  History reviewed  No pertinent surgical history  PT performed at least 2 patient identifiers during session: Name and wristband          Carolina Noland, SPT

## 2022-03-16 NOTE — PLAN OF CARE
Patient is AxOx4 & is on RA  VSS  Patient denies pain/discomfort  Patient sleeps between care  Neuro checks have been consistent  NIH Stroke Protocol & telemetry are maintained  Patient is independent in the room  Bed is in lowest position  Alarms absent  All needs are within reach  Problem: Neurological Deficit  Goal: Neurological status is stable or improving  Description: Interventions:  - Monitor and assess patient's level of consciousness, motor function, sensory function, and level of assistance needed for ADLs  - Monitor and report changes from baseline  Collaborate with interdisciplinary team to initiate plan and implement interventions as ordered  - Provide and maintain a safe environment  - Consider seizure precautions  - Consider fall precautions  - Consider aspiration precautions  - Consider bleeding precautions  Outcome: Progressing     Problem: Activity Intolerance/Impaired Mobility  Goal: Mobility/activity is maintained at optimum level for patient  Description: Interventions:  - Assess and monitor patient  barriers to mobility and need for assistive/adaptive devices  - Assess patient's emotional response to limitations  - Collaborate with interdisciplinary team and initiate plans and interventions as ordered  - Encourage independent activity per ability   - Maintain proper body alignment  - Perform active/passive rom as tolerated/ordered  - Plan activities to conserve energy   - Turn patient as appropriate  Outcome: Progressing     Problem: Communication Impairment  Goal: Ability to express needs and understand communication  Description: Assess patient's communication skills and ability to understand information  Patient will demonstrate use of effective communication techniques, alternative methods of communication and understanding even if not able to speak  - Encourage communication and provide alternate methods of communication as needed    - Collaborate with case management/ for discharge needs  - Include patient/family/caregiver in decisions related to communication    Outcome: Progressing     Problem: NEUROSENSORY - ADULT  Goal: Achieves stable or improved neurological status  Description: INTERVENTIONS  - Monitor and report changes in neurological status  - Monitor vital signs such as temperature, blood pressure, glucose, and any other labs ordered   - Initiate measures to prevent increased intracranial pressure  - Monitor for seizure activity and implement precautions if appropriate      Outcome: Progressing     Problem: CARDIOVASCULAR - ADULT  Goal: Maintains optimal cardiac output and hemodynamic stability  Description: INTERVENTIONS:  - Monitor I/O, vital signs and rhythm  - Monitor for S/S and trends of decreased cardiac output  - Administer and titrate ordered vasoactive medications to optimize hemodynamic stability  - Assess quality of pulses, skin color and temperature  - Assess for signs of decreased coronary artery perfusion  - Instruct patient to report change in severity of symptoms  Outcome: Progressing     Problem: METABOLIC, FLUID AND ELECTROLYTES - ADULT  Goal: Electrolytes maintained within normal limits  Description: INTERVENTIONS:  - Monitor labs and assess patient for signs and symptoms of electrolyte imbalances  - Administer electrolyte replacement as ordered  - Monitor response to electrolyte replacements, including repeat lab results as appropriate  - Instruct patient on fluid and nutrition as appropriate  Outcome: Progressing  Goal: Glucose maintained within target range  Description: INTERVENTIONS:  - Monitor Blood Glucose as ordered  - Assess for signs and symptoms of hyperglycemia and hypoglycemia  - Administer ordered medications to maintain glucose within target range  - Assess nutritional intake and initiate nutrition service referral as needed  Outcome: Progressing     Problem: Nutrition/Hydration-ADULT  Goal: Nutrient/Hydration intake appropriate for improving, restoring or maintaining nutritional needs  Description: Monitor and assess patient's nutrition/hydration status for malnutrition  Collaborate with interdisciplinary team and initiate plan and interventions as ordered  Monitor patient's weight and dietary intake as ordered or per policy  Utilize nutrition screening tool and intervene as necessary  Determine patient's food preferences and provide high-protein, high-caloric foods as appropriate  INTERVENTIONS:  - Monitor oral intake, urinary output, labs, and treatment plans  - Assess nutrition and hydration status and recommend course of action  - Evaluate amount of meals eaten  - Assist patient with eating if necessary   - Allow adequate time for meals  - Recommend/ encourage appropriate diets, oral nutritional supplements, and vitamin/mineral supplements  - Order, calculate, and assess calorie counts as needed  - Recommend, monitor, and adjust tube feedings and TPN/PPN based on assessed needs  - Assess need for intravenous fluids  - Provide specific nutrition/hydration education as appropriate  - Include patient/family/caregiver in decisions related to nutrition  Outcome: Progressing     Problem: Knowledge Deficit  Goal: Patient/family/caregiver demonstrates understanding of disease process, treatment plan, medications, and discharge instructions  Description: Complete learning assessment and assess knowledge base    Interventions:  - Provide teaching at level of understanding  - Provide teaching via preferred learning methods  Outcome: Progressing

## 2022-03-16 NOTE — CASE MANAGEMENT
Case Management Assessment & Discharge Planning Note    Patient name Carolina Castillo  Location /-01 MRN [id number]  : 1965 Date 3/16/2022       Current Admission Date: 3/15/2022  Current Admission Diagnosis:Hyperglycemia   Patient Active Problem List    Diagnosis Date Noted    Hyperglycemia 03/15/2022    History of hepatitis C 03/15/2022    Stroke-like symptoms 03/15/2022      LOS (days): 0  Geometric Mean LOS (GMLOS) (days):   Days to GMLOS:     OBJECTIVE:              Current admission status: Observation  Referral Reason: Other (Discharge planning)    Preferred Pharmacy:   [id number] [address], [address]  [address]  [place] [address]  Phone: [phone] Fax: [phone]    Primary Care Provider: DEVON Martinez    Primary Insurance:   Secondary Insurance:     ASSESSMENT:  Active Health Care Proxies    There are no active Health Care Proxies on file  Advance Directives  Does patient have a [address]?: No  Was patient offered paperwork?: Yes  Does patient currently have a Health Care decision maker?: Yes, please see Health Care Proxy section  Does patient have Advance Directives?: No  Was patient offered paperwork?: Yes  Primary Contact: Rebecca Mccray - spouse         Readmission Root Cause  30 Day Readmission: No    Patient Information  Admitted from[de-identified] Home  Mental Status: Alert  During Assessment patient was accompanied by: Not accompanied during assessment  Assessment information provided by[de-identified] Patient  Primary Caregiver: Self  Support Systems: Spouse/significant other  [place] of Residence: [address] do you live in?: [address] entry access options   Select all that apply : Stairs  Number of steps to enter home : 4  Do the steps have railings?: Yes  Type of Current Residence:  (1 story home)  In the last 12 months, was there a time when you were not able to pay the mortgage or rent on time?: No  In the last 12 months, how many places have you lived?: 1  In the last 12 months, was there a time when you did not have a steady place to sleep or slept in a shelter (including now)?: No  Homeless/housing insecurity resource given?: N/A  Living Arrangements: Lives w/ Spouse/significant other  Is patient a ?: No    Activities of Daily Living Prior to Admission  Functional Status: Independent  Completes ADLs independently?: Yes  Ambulates independently?: Yes  Does patient use assisted devices?: No  Does patient currently own DME?: No  Does patient have a history of Outpatient Therapy (PT/OT)?: No  Does the patient have a history of Short-Term Rehab?: No  Does patient have a history of HHC?: No  Does patient currently have [facility]?: No         Patient Information Continued  Income Source: Employed  Does patient have prescription coverage?: Yes  Within the past 12 months, you worried that your food would run out before you got the money to buy more : Never true  Within the past 12 months, the food you bought just didnt last and you didnt have money to get more : Never true  Food insecurity resource given?: N/A  Does patient receive dialysis treatments?: No  Does patient have a history of substance abuse?: No  Does patient have a history of Mental Health Diagnosis?: No         Means of Transportation  Means of Transport to Appts[de-identified] Drives Self  In the past 12 months, has lack of transportation kept you from medical appointments or from getting medications?: No  In the past 12 months, has lack of transportation kept you from meetings, work, or from getting things needed for daily living?: No  Was application for public transport provided?: N/A        DISCHARGE DETAILS:    Discharge planning discussed with[de-identified] patient  Freedom of Choice: Yes  Comments - Freedom of Choice: Pt reports he will have health insurance coverage through his employer starting 4/1/22  pt has no rehab needs per therapy   Pt has no other identified CM d/c needs at this time  Pt will return home with spouse on d/c  CM to follow    CM contacted family/caregiver?: No- see comments       Requested [address]         Is the patient interested in [place] [address] at discharge?: No    DME Referral Provided  Referral made for DME?: No      Treatment Team Recommendation: Home  Discharge Destination Plan[de-identified] Home  Transport at Discharge : Family (Spouse will transport)

## 2022-03-16 NOTE — PLAN OF CARE
Problem: Potential for Falls  Goal: Patient will remain free of falls  Description: INTERVENTIONS:  - Educate patient/family on patient safety including physical limitations  - Instruct patient to call for assistance with activity   - Consult OT/PT to assist with strengthening/mobility   - Keep Call bell within reach  - Keep bed low and locked with side rails adjusted as appropriate  - Keep care items and personal belongings within reach  - Initiate and maintain comfort rounds  - Make Fall Risk Sign visible to staff  Problem: Neurological Deficit  Goal: Neurological status is stable or improving  Description: Interventions:  - Monitor and assess patient's level of consciousness, motor function, sensory function, and level of assistance needed for ADLs  - Monitor and report changes from baseline  Collaborate with interdisciplinary team to initiate plan and implement interventions as ordered  - Provide and maintain a safe environment  - Consider seizure precautions  - Consider fall precautions  - Consider aspiration precautions  - Consider bleeding precautions  Outcome: Progressing     Problem: Potential for Aspiration  Goal: Non-ventilated patient's risk of aspiration is minimized  Description: Assess and monitor vital signs, respiratory status, and labs (WBC)  Monitor for signs of aspiration (tachypnea, cough, rales, wheezing, cyanosis, fever)  - Assess and monitor patient's ability to swallow  - Place patient up in chair to eat if possible  - HOB up at 90 degrees to eat if unable to get patient up into chair   - Supervise patient during oral intake  - Instruct patient/ family to take small bites  - Instruct patient/ family to take small single sips when taking liquids    - Follow patient-specific strategies generated by speech pathologist   Outcome: Progressing  Goal: Ventilated patient's risk of aspiration is minimized  Description: Assess and monitor vital signs, respiratory status, airway cuff pressure, and labs (WBC)  Monitor for signs of aspiration (tachypnea, cough, rales, wheezing, cyanosis, fever)  - Elevate head of bed 30 degrees if patient has tube feeding   - Monitor tube feeding    Outcome: Progressing     - Apply yellow socks and bracelet for high fall risk patients  - Consider moving patient to room near nurses station  Outcome: Progressing

## 2022-03-16 NOTE — ASSESSMENT & PLAN NOTE
· The patient admits to visual disturbances, dizziness, generalized weakness which has been going on for the past 2-3 weeks  · Suspected that this could be related to hyperglycemia/newly diagnosed diabetes however will rule out TIA/CVA  · Stroke pathway  · Will hold off on inpatient Neurology consult as suspect symptoms are due to hyperglycemia as imaging is unremarkable, NIH stroke scale 0  · Will hold off on echocardiogram for now  · Hemoglobin A1c >14, lipid panel elevated cholesterol and LDL

## 2022-03-16 NOTE — ASSESSMENT & PLAN NOTE
· Cholesterol and LDL elevated  · Patient should continue with dietary changes including exercise and weight loss as he has been doing  · Continue atorvastatin

## 2022-03-16 NOTE — ASSESSMENT & PLAN NOTE
· Hemoglobin A1c of 7 8 on 11/2021  · This was found incidentally on his blood work while he was under hepatitis C treatment  · The patient has been checking his blood sugar at home and noticed that it has been between 400-500  Currently not on any medications  The patient did have some diet modifications and has lost almost 40 lb over the past few months  Has just established care with a new PCP and the appointment is not until April  · Will give 8 units of Lantus and Humalog 5 units now  · Continue with IV fluid  · No evidence of DKA, mild elevations of beta hydroxybutyrate- 1 1   · Pseudo hyponatremia due to hyperglycemia  · Started on SSI, meals coverage with lantus; pending repeat hemoglobin A1c and will need regimen for anti hyperglycemia on discharge and possible endocrinology follow-up  · Patient will not had health insurance benefits until April 1st   Discussed with attending, will start with oral hypoglycemics  Discussed with patient, he is in agreement  He will follow-up with primary care the 1st week of April  His wife is trying to move it forward  He has an eye appointment for his blurry vision April 2nd

## 2022-03-16 NOTE — UTILIZATION REVIEW
Initial Clinical Review    Admission: Date/Time/Statement:   Admission Orders (From admission, onward)     Ordered        03/15/22 1204  Place in Observation  Once                      Orders Placed This Encounter   Procedures    Place in Observation     Standing Status:   Standing     Number of Occurrences:   1     Order Specific Question:   Level of Care     Answer:   Med Surg [16]     ED Arrival Information     Expected Arrival Acuity    - 3/15/2022 08:25 Urgent         Means of arrival Escorted by Service Admission type    SEBLE DE GUZMAN  [facility] Hospitalist Urgent         Arrival complaint    high blood sugar        Chief Complaint   Patient presents with    Hyperglycemia - Symptomatic     started noticing last year that fasting glucose in the mid 100's  over the last few days sugar has been in excess of 500  visual changes  Initial Presentation: 49 y o  male who presents with generalized weakness, visual disturbances, dizziness, and hyperglycemia  The patient past medical history of 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  The patient reports worsening generalized weakness, lightheadedness, intermittent headache and hyperglycemia over the past 2-3 weeks  The patient has been taking his blood glucose home after an incidental finding of hyperglycemia on his recent blood work while he was under treatment for hepatitis-C  His blood glucose has been ranging from 400-500 even fasting blood glucose  Wife reports that the patient does have intermittent confusion with some slurred speech but without any facial or extremity weakness  No prior history of TIA/stroke  Currently he denies any chest pain, dyspnea, vomiting, or abdominal pain  He has been trying to cut carbohydrates intake however without improvement of his blood glucose  ADMIT OBSERVATION STATUS      ED Triage Vitals [03/15/22 0843]   Temperature Pulse Respirations Blood Pressure SpO2   (!) 96 8 °F (36 °C) 74 18 145/81 96 %      Temp Source Heart Rate Source Patient Position - Orthostatic VS BP Location FiO2 (%)   Tympanic Monitor Sitting Left arm --      Pain Score       2          Wt Readings from Last 1 Encounters:   03/15/22 76 6 kg (168 lb 14 oz)     Additional Vital Signs:   Date/Time Temp Pulse Resp BP MAP (mmHg) SpO2 O2 Device Patient Position - Orthostatic VS   03/16/22 07:51:36 97 1 °F (36 2 °C) Abnormal  67 19 120/90 100 94 % None (Room air) Sitting   03/16/22 0550 97 8 °F (36 6 °C) 63 14 108/73 -- 93 % None (Room air) --   03/16/22 0150 97 8 °F (36 6 °C) 87 16 108/73 -- 93 % None (Room air) --   03/15/22 2350 98 1 °F (36 7 °C) 60 16 103/69 -- 93 % None (Room air) --   03/15/22 2150 98 2 °F (36 8 °C) 64 16 112/84 -- 94 % None (Room air) --   03/15/22 1950 97 2 °F (36 2 °C) Abnormal  66 16 104/64 -- 94 % None (Room air) --   03/15/22 14:50:30 97 3 °F (36 3 °C) Abnormal  62 19 119/84 96 96 % -- --   03/15/22 1450 97 3 °F (36 3 °C) Abnormal  -- -- -- -- 96 % None (Room air) --   03/15/22 1400 -- 61 -- 127/80 96 93 % -- --   03/15/22 1330 -- 65 -- 119/82 98 96 % -- --   03/15/22 1200 -- 58 -- 128/79 100 96 % -- --   03/15/22 1130 -- 56 -- 118/74 92 94 % -- --   03/15/22 1100 -- 56 -- 124/75 95 92 % -- --   03/15/22 1050 -- 59 -- 123/72 93 94 % -- --   03/15/22 0845 -- 70 -- 145/81 103 95 % -- --       Pertinent Labs/Diagnostic Test Results:   3/15 EKG:  NSR    MRI brain wo contrast   Final Result by Baron Sinha DO (03/16 0932)      Unremarkable MRI of the brain  CTA head and neck with and without contrast   Final Result by Mt Butler MD (03/15 1026)      1  No acute intracranial CT abnormality  2   Unremarkable CT angiogram of the head and neck  3   Mild right maxillary sinus disease  XR chest 1 view portable   Final Result by Maverick Townsend MD (03/15 1006)      No acute cardiopulmonary disease           Results from last 7 days   Lab Units 03/16/22  0552 03/15/22  0910   WBC Thousand/uL 5 37 7 18   HEMOGLOBIN g/dL 14 2 14 2   HEMATOCRIT % 41 7 42 6   PLATELETS Thousands/uL 206 219   NEUTROS ABS Thousands/µL  --  3 90         Results from last 7 days   Lab Units 03/16/22  0552 03/15/22  0910   SODIUM mmol/L 136 130*   POTASSIUM mmol/L 3 9 4 0   CHLORIDE mmol/L 101 96   CO2 mmol/L 27 24   ANION GAP mmol/L 8 10   BUN mg/dL 15 28*   CREATININE mg/dL 0 68 0 84   EGFR ml/min/1 73sq m 108 99   CALCIUM mg/dL 9 0 9 6   MAGNESIUM mg/dL 2 0  --    PHOSPHORUS mg/dL 3 1  --      Results from last 7 days   Lab Units 03/16/22  0552   AST U/L 26   ALT U/L 23   ALK PHOS U/L 65   TOTAL PROTEIN g/dL 6 6   ALBUMIN g/dL 3 7   TOTAL BILIRUBIN mg/dL 0 41     Results from last 7 days   Lab Units 03/16/22  0550 03/16/22  0156 03/15/22  2124 03/15/22  1550 03/15/22  0854   POC GLUCOSE mg/dl 278* 219* 365* 207* 289*     Results from last 7 days   Lab Units 03/16/22  0552 03/15/22  0910   GLUCOSE RANDOM mg/dL 262* 299*         Results from last 7 days   Lab Units 03/15/22  0910   HEMOGLOBIN A1C % 13 4*  >14 0*   EAG mg/dl 338  >355     BETA-HYDROXYBUTYRATE   Date Value Ref Range Status   03/15/2022 1 1 (H) <0 6 mmol/L Final          Results from last 7 days   Lab Units 03/15/22  0910   PH MERNA  7 396   PCO2 MERNA mm Hg 40 0*   PO2 MERNA mm Hg 76 6*   HCO3 MERNA mmol/L 24 0   BASE EXC MERNA mmol/L -0 7   O2 CONTENT MERNA ml/dL 19 0   O2 HGB, VENOUS % 92 0*     Results from last 7 days   Lab Units 03/15/22  0910   HS TNI 0HR ng/L 3         Results from last 7 days   Lab Units 03/15/22  0910   PROTIME seconds 13 0   INR  0 99   PTT seconds 30     Results from last 7 days   Lab Units 03/15/22  0910   TSH 3RD GENERATON uIU/mL 0 494     Results from last 7 days   Lab Units 03/15/22  0915   CLARITY UA  Clear   COLOR UA  Yellow   SPEC GRAV UA  1 010   PH UA  5 5   GLUCOSE UA mg/dl 3+*   KETONES UA mg/dl 15 (1+)*   BLOOD UA  Negative   PROTEIN UA mg/dl Negative   NITRITE UA  Negative   BILIRUBIN UA  Negative   UROBILINOGEN UA E U /dl 0 2   LEUKOCYTES UA  Negative     ED Treatment:   Medication Administration from 03/15/2022 0825 to 03/15/2022 1435       Date/Time Order Dose Route Action     03/15/2022 0915 sodium chloride 0 9 % bolus 1,000 mL 1,000 mL Intravenous New Bag     03/15/2022 0955 iohexol (OMNIPAQUE) 350 MG/ML injection (SINGLE-DOSE) 85 mL 85 mL Intravenous Given     03/15/2022 1414 aspirin tablet 325 mg 325 mg Oral Given     03/15/2022 1414 multivitamin-minerals (CENTRUM) tablet 1 tablet 1 tablet Oral Given     03/15/2022 1414 multi-electrolyte (PLASMALYTE-A/ISOLYTE-S PH 7 4) IV solution 100 mL/hr Intravenous New Bag        Past Medical History:   Diagnosis Date    Cirrhosis ([address] )     Hepatitis C virus infection cured after antiviral drug therapy      Present on Admission:  **None**      Admitting Diagnosis: TIA (transient ischemic attack) [G45 9]  Diabetes (HCC) [E11 9]  Blood sugar increased [R73 09]  Hyperglycemia [R73 9]  Age/Sex: 49 y o  male  Admission Orders:  Scheduled Medications:  ascorbic acid, 500 mg, Oral, BID  aspirin, 81 mg, Oral, Daily  atorvastatin, 40 mg, Oral, Daily With Dinner  insulin glargine, 8 Units, Subcutaneous, Once  insulin glargine, 8 Units, Subcutaneous, QAM  insulin lispro, 1-6 Units, Subcutaneous, TID AC  insulin lispro, 1-6 Units, Subcutaneous, HS  insulin lispro, 3 Units, Subcutaneous, TID With Meals  insulin lispro, 5 Units, Subcutaneous, Once  multivitamin-minerals, 1 tablet, Oral, Daily      Continuous IV Infusions:  multi-electrolyte, 100 mL/hr, Intravenous, Continuous      PRN Meds:  acetaminophen, 650 mg, Oral, Q6H PRN        IP CONSULT TO NEUROLOGY  IP CONSULT TO CASE MANAGEMENT  IP CONSULT TO NUTRITION SERVICES    Network Utilization Review Department  ATTENTION: Please call with any questions or concerns to [phone] and carefully listen to the prompts so that you are directed to the right person   All voicemails are confidential   Harlene Pair all requests for admission clinical reviews, approved or denied determinations and any other requests to dedicated fax number below belonging to the campus where the patient is receiving treatment   List of dedicated fax numbers for the Facilities:  [address] [place] [address] DENIALS (Administrative/Medical Necessity) [phone]   [address] (Maternity/NICU/Pediatrics) [phone]   [address]  [address] [place] [address] [place] [address] [id number] [place] [address] [address] [facility] [place] [address] [phone]

## 2022-03-16 NOTE — OCCUPATIONAL THERAPY NOTE
Occupational Therapy Screen      Patient Name: Christine Suh  [id number] Date: 3/16/2022  Problem List  Principal Problem:    Hyperglycemia  Active Problems:    History of hepatitis C    Stroke-like symptoms          03/16/22 1121   OT Last Visit   OT Visit Date 03/16/22   Note Type   Note type Screen     Order received for OT Evaluation  Spoke with PT and patient who indicate that the patient has been functioning at an independent level in room with no assistive device  The patient has no concerns about returning home with family support and completing daily activites  Pt verbalized only concern being decreased vision but reports that he will follow-up with optometrists  Pt reports strength, balance, and mobility is at baseline  Patient encouraged to ambulate on nursing floor ad ketty until discharge in order to maintain mobility and independent status  OT will sign-off and discontinue orders  Please re-consult if warranted       Robert Du, OT

## 2022-03-16 NOTE — DISCHARGE INSTR - AVS FIRST PAGE
Please continue to watch her diet including her carbohydrate and sugar intake  Follow-up with primary care as soon as you get it scheduled as we discussed as early as possible in April  Also follow-up with your eye doctor as we discussed  Come back to the hospital immediately for any worsening or new concerns

## 2024-01-10 ENCOUNTER — OFFICE VISIT (OUTPATIENT)
Dept: FAMILY MEDICINE CLINIC | Facility: CLINIC | Age: 59
End: 2024-01-10
Payer: COMMERCIAL

## 2024-01-10 VITALS
HEART RATE: 82 BPM | SYSTOLIC BLOOD PRESSURE: 122 MMHG | OXYGEN SATURATION: 97 % | TEMPERATURE: 96.5 F | DIASTOLIC BLOOD PRESSURE: 80 MMHG | BODY MASS INDEX: 26.19 KG/M2 | HEIGHT: 72 IN | WEIGHT: 193.4 LBS

## 2024-01-10 DIAGNOSIS — Z76.89 ENCOUNTER TO ESTABLISH CARE: Primary | ICD-10-CM

## 2024-01-10 DIAGNOSIS — E11.9 TYPE 2 DIABETES MELLITUS WITHOUT COMPLICATION, WITHOUT LONG-TERM CURRENT USE OF INSULIN (HCC): ICD-10-CM

## 2024-01-10 DIAGNOSIS — K74.69 COMPENSATED CIRRHOSIS RELATED TO HEPATITIS C VIRUS (HCV): ICD-10-CM

## 2024-01-10 DIAGNOSIS — I15.2 HYPERTENSION ASSOCIATED WITH DIABETES: ICD-10-CM

## 2024-01-10 DIAGNOSIS — Z23 ENCOUNTER FOR IMMUNIZATION: ICD-10-CM

## 2024-01-10 DIAGNOSIS — Z86.19 HISTORY OF HEPATITIS C: ICD-10-CM

## 2024-01-10 DIAGNOSIS — B19.20 COMPENSATED CIRRHOSIS RELATED TO HEPATITIS C VIRUS (HCV): ICD-10-CM

## 2024-01-10 DIAGNOSIS — Z11.4 SCREENING FOR HIV (HUMAN IMMUNODEFICIENCY VIRUS): ICD-10-CM

## 2024-01-10 DIAGNOSIS — F19.11 HISTORY OF INTRAVENOUS DRUG ABUSE (HCC): ICD-10-CM

## 2024-01-10 DIAGNOSIS — E78.2 MIXED HYPERLIPIDEMIA: ICD-10-CM

## 2024-01-10 DIAGNOSIS — Z87.891 HISTORY OF TOBACCO ABUSE: ICD-10-CM

## 2024-01-10 DIAGNOSIS — E11.59 HYPERTENSION ASSOCIATED WITH DIABETES: ICD-10-CM

## 2024-01-10 LAB — SL AMB POCT HEMOGLOBIN AIC: 6.6 (ref ?–6.5)

## 2024-01-10 PROCEDURE — 99204 OFFICE O/P NEW MOD 45 MIN: CPT

## 2024-01-10 PROCEDURE — 83036 HEMOGLOBIN GLYCOSYLATED A1C: CPT

## 2024-01-10 RX ORDER — GLIMEPIRIDE 1 MG/1
1 TABLET ORAL
Qty: 90 TABLET | Refills: 3 | Status: SHIPPED | OUTPATIENT
Start: 2024-01-10 | End: 2025-01-04

## 2024-01-10 RX ORDER — ATORVASTATIN CALCIUM 40 MG/1
40 TABLET, FILM COATED ORAL DAILY
Qty: 30 TABLET | Refills: 2 | Status: SHIPPED | OUTPATIENT
Start: 2024-01-10 | End: 2024-01-11 | Stop reason: SDUPTHER

## 2024-01-10 RX ORDER — METFORMIN HYDROCHLORIDE 500 MG/1
1000 TABLET, EXTENDED RELEASE ORAL
Qty: 180 TABLET | Refills: 1 | Status: SHIPPED | OUTPATIENT
Start: 2024-01-10 | End: 2024-07-08

## 2024-01-10 RX ORDER — GLIMEPIRIDE 1 MG/1
1 TABLET ORAL
COMMUNITY
End: 2024-01-10 | Stop reason: SDUPTHER

## 2024-01-10 NOTE — PATIENT INSTRUCTIONS
Going to have to recommend a new Jae for diabetes management!!    Type 2 Diabetes Management for Adults   AMBULATORY CARE:   Type 2 diabetes  is a disease that affects how your body uses glucose (sugar). Either your body cannot make enough insulin, or it cannot use the insulin correctly. It is important to keep diabetes controlled to prevent damage to your heart, blood vessels, and other organs. Management will help you feel well and enjoy your daily activities. Your diabetes care team providers can help you make a plan to fit diabetes care into your schedule. Your plan can change over time to fit your needs and your family's needs.       Have someone call your local emergency number (911 in the [place]) if:   You cannot be woken.    You have signs of diabetic ketoacidosis:     confusion, fatigue    vomiting    rapid heartbeat    fruity smelling breath    extreme thirst    dry mouth and skin    You have any of the following signs of a heart attack:      Squeezing, pressure, or pain in your chest    You may  also have any of the following:     Discomfort or pain in your back, neck, jaw, stomach, or arm    Shortness of breath    Nausea or vomiting    Lightheadedness or a sudden cold sweat    You have any of the following signs of a stroke:      Numbness or drooping on one side of your face     Weakness in an arm or leg    Confusion or difficulty speaking    Dizziness, a severe headache, or vision loss    Call your doctor or diabetes care team provider if:   You have a sore or wound that will not heal.    You have a change in the amount you urinate.    Your blood sugar levels are higher than your target goals.    You often have lower blood sugar levels than your target goals.    Your skin is red, dry, warm, or swollen.    You have trouble coping with diabetes, or you feel anxious or depressed.    You have trouble following any part of your care plan, such as your meal plan.    You have questions or concerns about your  condition or care.    What you need to know about high blood sugar levels:  High blood sugar levels may not cause any symptoms. You may feel more thirsty or urinate more often than usual. Over time, high blood sugar levels can damage your nerves, blood vessels, tissues, and organs. The following can increase your blood sugar levels:  Large meals or large amounts of carbohydrates at one time    Less physical activity    Stress    Illness    A lower dose of diabetes medicine or insulin, or a late dose    What you need to know about low blood sugar levels:  Symptoms include feeling shaky, dizzy, irritable, or confused. You can prevent symptoms by keeping your blood sugar levels from going too low.  Treat a low blood sugar level right away:      Drink 4 ounces of juice or have 1 tube of glucose gel.    Check your blood sugar level again 10 to 15 minutes later.    When the level goes back to normal, eat a meal or snack to prevent another decrease.       Keep glucose gel, raisins, or hard candy with you at all times to treat a low blood sugar level.     Your blood sugar level can get too low if you take diabetes medicine or insulin and do not eat enough food.     If you use insulin, check your blood sugar level before you exercise.      If your blood sugar level is below 100 mg/dL, eat 4 crackers or 2 ounces of raisins, or drink 4 ounces of juice.    Check your level every 30 minutes if you exercise longer than 1 hour.    You may need a snack during or after exercise.    What you can do to manage your blood sugar levels:   Check your blood sugar levels as directed and as needed.  Several items are available to use to check your levels. You may need to check by testing a drop of blood in a glucose monitor. You may instead be given a continuous glucose monitoring (CGM) device. The device is worn at all times. The CGM checks your blood sugar level every 5 minutes. It sends results to an electronic device such as a smart  phone. A CGM can be used with or without an insulin pump. You and your diabetes care team providers will decide on the best method for you. The goal for blood sugar levels before meals  is between 80 and 130 mg/dL and 2 hours after eating  is lower than 180 mg/dL.            Make healthy food choices.  Work with a dietitian to create a meal plan that works for you and your schedule. A dietitian can help you learn how to eat the right amount of carbohydrates (sugar and starchy foods) during your meals and snacks. Examples of carbohydrates are breads, cereals, rice, pasta, fruit, low-fat dairy, and sweets. Carbohydrates can raise your blood sugar level if you eat too many at one time.         Eat high-fiber foods as directed.  Fiber helps improve blood sugar levels. Fiber also lowers your risk for heart disease and other problems diabetes can cause. Examples of high-fiber foods include vegetables, whole-grain bread, and beans such as mejia beans. Your dietitian can tell you how much fiber to have each day.         Get regular physical activity.  Physical activity can help you get to your target blood sugar level goal and manage your weight. Get at least 150 minutes of moderate to vigorous aerobic physical activity each week. Resistance training, such as lifting weights, should be done 3 times each week. Do not miss more than 2 days of physical activity in a row. Do not sit longer than 30 minutes at a time. Your healthcare provider can help you create an activity plan. The plan can include the best activities for you and can help you build your strength and endurance.            Maintain a healthy weight.  Ask your team what a healthy weight is for you. A healthy weight can help you control diabetes and prevent heart disease. Ask your team to help you create a weight-loss plan, if needed. Even a loss of 3% to 7% of your excess body weight can help make a difference in managing diabetes. Your team will help you set a  weight-loss goal, such as 10 to 15 pounds, or 5% of your extra weight. Together you and your team can set manageable weight-loss goals.    Take your diabetes medicine or insulin as directed.  You may need diabetes medicine, insulin, or both to help control your blood sugar levels. Your healthcare provider will teach you how and when to take your diabetes medicine or insulin. You will also be taught about side effects oral diabetes medicine can cause. Insulin may be injected or given through a pump or pen. You and your providers will decide on the best method for you:    An insulin pump  is an implanted device that gives your insulin 24 hours a day. An insulin pump prevents the need for multiple insulin injections in a day.         An insulin pen  is a device prefilled with the right amount of insulin.         You and your family members will be taught how to draw up and give insulin  if this is the best method for you. Your providers will also teach you how to dispose of needles and syringes.    You will learn how much insulin you need  and when to give it. You will be taught when not to give insulin. You will also be taught what to do if your blood sugar level drops too low. This may happen if you take insulin and do not eat the right amount of carbohydrates.    More ways to manage type 2 diabetes:   Wear medical alert identification.  Wear medical alert jewelry or carry a card that says you have diabetes. Ask your provider where to get these items.         Do not smoke.  Nicotine and other chemicals in cigarettes and cigars can cause lung and blood vessel damage. It also makes it more difficult to manage your diabetes. Ask your provider for information if you currently smoke and need help to quit. Do not use e-cigarettes or smokeless tobacco in place of cigarettes or to help you quit. They still contain nicotine.    Check your feet each day for cuts, scratches, calluses, or other wounds.  Look for redness and  swelling, and feel for warmth. Wear shoes that fit well. Check your shoes for rocks or other objects that can hurt your feet. Do not walk barefoot or wear shoes without socks. Wear cotton socks to help keep your feet dry.         Ask about vaccines you may need.  You have a higher risk for serious illness if you get the flu, pneumonia, COVID-19, or hepatitis. Ask your provider if you should get vaccines to prevent these or other diseases, and when to get the vaccines.    Talk to your provider if you become stressed about diabetes care.  Sometimes being able to fit diabetes care into your life can cause increased stress. The stress can cause you not to take care of yourself properly. Your provider can help by offering tips about self-care. A mental health provider can listen and offer help with self-care issues. Other types of counseling can help you make nutrition or physical activity changes.    Have your A1c checked as directed.  Your provider may check your A1c every 3 months, or 2 times each year if your diabetes is controlled. An A1c test shows the average amount of sugar in your blood over the past 2 to 3 months. Your provider will tell you what your A1c level should be.    Have screening tests as directed.  Your provider may recommend screening for complications of diabetes and other conditions that may develop. Some screenings may begin right away and some may happen within the first 5 years of diagnosis:    Examples of diabetes complications  include kidney problems, high cholesterol, high blood pressure, blood vessel problems, eye problems, and sleep apnea.    You may be screened for a low vitamin B level  if you take oral diabetes medicine for a long time.    You may be screened for polycystic ovarian syndrome (PCOS)  if you are of childbearing age.    Follow up with your doctor or diabetes care team providers as directed:  You may need to have blood tests done before your follow-up visit. The test  results will show if changes need to be made in your treatment or self-care. Talk to your provider if you cannot afford your medicine. Write down your questions so you remember to ask them during your visits.  © Copyright [employer] 2023 Information is for End User's use only and may not be sold, redistributed or otherwise used for commercial purposes.  The above information is an  only. It is not intended as medical advice for individual conditions or treatments. Talk to your doctor, nurse or pharmacist before following any medical regimen to see if it is safe and effective for you.    Basic Carbohydrate Counting   AMBULATORY CARE:   Carbohydrate counting  is a way to plan your meals by counting the amount of carbohydrate in foods. Carbohydrates are the sugars, starches, and fiber found in fruit, grains, vegetables, and milk products. Carbohydrates increase your blood sugar levels. Carbohydrate counting can help you eat the right amount of carbohydrate to keep your blood sugar levels under control.   What you need to know about planning meals using carbohydrate counting:  A dietitian or healthcare provider will help you develop a healthy meal plan that works best for you. You will be taught how much carbohydrate to eat or drink for each meal and snack. Your meal plan will be based on your age, weight, usual food intake, and physical activity level. If you have diabetes, it will also include your blood sugar levels and diabetes medicine. Once you know how much carbohydrate you should eat, you can decide what type of food you want to eat.    You will need to know what foods contain carbohydrate and how much they contain. Keep track of the amount of carbohydrate in meals and snacks in order to follow your meal plan. Do not avoid carbohydrates or skip meals. Your blood sugar may fall too low if you do not eat enough carbohydrate or you skip meals.    Foods that contain carbohydrate:   Breads:  Each serving  of food listed below contains about 15 g of carbohydrate .    1 slice of bread (1 ounce) or 1 flour or corn tortilla (6 inch)    ½ of a hamburger bun or ¼ of a large bagel (about 1 ounce)    1 pancake (about 4 inches across and ¼ inch thick)    Cereals and grains:  Serving sizes of ready-to-eat cereals vary. Look at the serving size and the total carbohydrate amount listed on the food label. Each serving of food listed below contains about 15 g of carbohydrate .    ¾ cup of dry, unsweetened, ready-to-eat cereal or ¼ cup of low-fat granola     ½ cup of oatmeal or other cooked cereal     ? cup of cooked rice or pasta    Starchy vegetables and beans:  Each serving of food listed below contains about 15 g of carbohydrate .    ½ cup of corn, green peas, sweet potatoes, or mashed potatoes    ¼ of a large baked potato    ½ cup of beans, lentils, and peas (garbanzo, mejia, kidney, white, split, black-eyed)    Crackers and snacks:  Each serving of food listed below contains about 15 g of carbohydrate .    3 jamarcus cracker squares or 8 animal crackers     6 saltine-type crackers    3 cups of popcorn or ¾ ounce of pretzels, potato chips, or tortilla chips    Fruit:  Each serving of food listed below contains about 15 g of carbohydrate .    1 small (4 ounce) piece of fresh fruit or ¾ to 1 cup of fresh fruit    ½ cup of canned or frozen fruit, packed in natural juice    ½ cup (4 ounces) of unsweetened fruit juice    2 tablespoons of dried fruit    Desserts or sugary foods:  Each serving of food listed below contains about 15 g of carbohydrate .    2-inch square unfrosted cake or brownie     2 small cookies    ½ cup of ice cream, frozen yogurt, or nondairy frozen yogurt    ¼ cup of sherbet or sorbet    1 tablespoon of regular syrup, jam, or jelly    2 tablespoons of light syrup    Milk and yogurt:  Foods from the milk group contain about 12 g of carbohydrate per serving.    1 cup of fat-free or low-fat milk    1 cup of soy  milk    ? cup of fat-free, yogurt sweetened with artificial sweetener    Non-starchy vegetables:  Each serving contains about 5 g of carbohydrate . Three servings of non-starch vegetables count as 1 carbohydrate serving.     ½ cup of cooked vegetables or 1 cup of raw vegetables. This includes beets, broccoli, cabbage, cauliflower, cucumber, mushrooms, tomatoes, and zucchini    ½ cup of vegetable juice    How to use carbohydrate counting to plan meals:   Count carbohydrate amounts using serving sizes:      Pasta dinner example:  You plan to have pasta, tossed salad, and an 8-ounce glass of milk. Your healthcare provider tells you that you may have 4 carbohydrate servings for dinner. One carbohydrate serving of pasta is ? cup. One cup of pasta will equal 3 carbohydrate servings. An 8-ounce glass of milk will count as 1 carbohydrate serving. These amounts of food would equal 4 carbohydrate servings. One cup of tossed salad does not count toward your carbohydrate servings.       Count carbohydrate amounts using food labels:  Find the total amount of carbohydrate in a packaged food by reading the food label. Food labels tell you the serving size of the food and the total carbohydrate amount in each serving. Find the serving size on the food label and then decide how many servings you will eat. Multiply the number of servings you plan to eat by the carbohydrate amount per serving.     Granola bar snack example:  Your meal plan allows you to have 2 carbohydrate servings (30 grams) of carbohydrate for a snack. You plan to eat 1 package of granola bars, which contains 2 bars. According to the food label, the serving size of food in this package is 1 bar. Each serving (1 bar) contains 25 grams of carbohydrate. The total amount of carbohydrate in this package of granola bars would be 50 g. Based on your meal plan, you should eat only 1 bar.      Follow up with your doctor as directed:  Write down your questions so you remember  to ask them during your visits.  © Copyright [employer] 2023 Information is for End User's use only and may not be sold, redistributed or otherwise used for commercial purposes.  The above information is an  only. It is not intended as medical advice for individual conditions or treatments. Talk to your doctor, nurse or pharmacist before following any medical regimen to see if it is safe and effective for you.    Foot Care for People with Diabetes   AMBULATORY CARE:   What you need to know about foot care:  Long-term high blood sugar levels can damage the blood vessels and nerves in your legs and feet. This damage makes it hard to feel pressure, pain, temperature, and touch. You may not be able to feel a cut or sore, or shoes that are too tight. Foot care is needed to prevent serious problems, such as an infection or amputation. Diabetes may cause your toes to become crooked or curved under. These changes may affect the way you walk and can lead to increased pressure on your foot. The pressure can decrease blood flow to your feet. Lack of blood flow increases your risk for a foot ulcer.  Call your care team provider if:   Your feet become numb, weak, or hard to move.    You have pus draining from a sore on your foot.    You have a wound on your foot that gets bigger, deeper, or does not heal.    You see blisters, cuts, scratches, calluses, or sores on your foot.    You have a fever, and your feet become red, warm, and swollen.    Your toenails become thick, curled, or yellow.    You find it hard to check your feet because your vision is poor.    You have questions or concerns about your condition or care.    How to care for your feet:   Check your feet each day.  Look at your whole foot, including the bottom, and between and under your toes. Check for wounds, corns, and calluses. Use a mirror to see the bottom of your feet. The skin on your feet may be shiny, tight, or darker than normal. Your feet may  also be cold and pale. Feel your feet by running your hands along the tops, bottoms, sides, and between your toes. Redness, swelling, and warmth are signs of blood flow problems that can lead to a foot ulcer. Do not try to remove corns or calluses yourself. Do not ignore small problems, such as dry skin or small wounds. These can become life-threatening over time without proper care.         Wash your feet each day with soap and warm water.  Do not use hot water, because this can injure your foot. Dry your feet gently with a towel after you wash them. Dry between and under your toes.    Apply lotion or a moisturizer on your dry feet.  Ask your care team provider what lotions are best to use. Do not put lotion or moisturizer between your toes. Moisture between your toes could lead to skin breakdown.    Cut your toenails correctly.  File or cut your toenails straight across. Use a soft brush to clean around your toenails. If your toenails are very thick, you may need to have a care team provider or specialist cut them.     Protect your feet.  Do not walk barefoot or wear your shoes without socks. Check your shoes for rocks or other objects that can hurt your feet. Wear cotton socks to help keep your feet dry. Wear socks without toe seams, or wear them with the seams inside out. Change your socks each day. Do not wear socks that are dirty or damp.    Wear shoes that fit well.  Wear shoes that do not rub against any area of your feet. Your shoes should be ½ to ¾ inch (1 to 2 centimeters) longer than your feet. Your shoes should also have extra space around the widest part of your feet. Walking or athletic shoes with laces or straps that adjust are best. Ask your care team provider for help to choose shoes that fit you best. Ask your provider if you need to wear an insert, orthotic, or bandage on your feet.         Go to your follow-up visits.  Your care team provider will do a foot exam at least 1 time each year. You  may need a foot exam more often if you have nerve damage, foot deformities, or ulcers. Your provider will check for nerve damage and how well you can feel your feet. Your provider will check your shoes to see if they fit well.    Do not smoke.  Smoking can damage your blood vessels and put you at increased risk for foot ulcers. Ask your care team provider for information if you currently smoke and need help to quit. E-cigarettes or smokeless tobacco still contain nicotine. Talk to your care team provider before you use these products.    Follow up with your diabetes care team provider or foot specialist as directed:  You will need to have your feet checked at least 1 time each year. You may need a foot exam more often if you have nerve damage, foot deformities, or ulcers. Write down your questions so you remember to ask them during your visits.  © Copyright [employer] 2023 Information is for End User's use only and may not be sold, redistributed or otherwise used for commercial purposes.  The above information is an  only. It is not intended as medical advice for individual conditions or treatments. Talk to your doctor, nurse or pharmacist before following any medical regimen to see if it is safe and effective for you.    What to Do if Your Blood Sugar is Low   AMBULATORY CARE:   Low blood sugar levels  (hypoglycemia) can happen with Type 1 and Type 2 diabetes. Low levels are more likely to happen if you use insulin. Hypoglycemia can cause you to have falls, accidents, and injuries. A blood sugar level that gets too low can lead to seizures, coma, and death. Learn to recognize the symptoms early so you can get treatment quickly.  When your blood sugar is low you may feel:  Sweaty    Nervous or shaky    Anxious or irritable    Confused    A fast, pounding heartbeat    Extremely hungry    Have someone call your local emergency number (911 in the [place]) if:   You cannot be woken.    You have a  seizure.    Call your doctor if:   You have symptoms of a low blood sugar level, such as trouble thinking, sweating, or a pounding heartbeat.    Your blood sugar level is lower than normal and it does not improve with treatment.     You often have lower blood sugar levels than your target goals.    You have trouble coping with your illness, or you feel anxious or depressed.     You have questions or concerns about your condition or care.    What to do if you have symptoms of low blood sugar:   Check your blood sugar level, if possible.  Your blood sugar level is too low if it is at or below 70 mg/dL.     Eat or drink 15 grams of fast-acting carbohydrate. Fast-acting carbohydrates will raise your blood sugar level quickly. Examples of 15 grams of fast-acting carbohydrates:     4 ounces (½ cup) of fruit juice     4 ounces of regular soda    2 tablespoons of raisins     1 tube of glucose gel or 3 to 4 glucose tablets       Check your blood sugar level 15 minutes later.  If the level is still low (less than 100 mg/dL), eat another 15 grams of carbohydrate. When the level returns to 100 mg/dL, eat a snack or meal that contains carbohydrates. This will help prevent another drop in blood sugar.    Teach people close to you how to use your glucagon kit.  Your blood sugar may be too low for you to be awake. People need to know when and how to use your kit.    Prevent low blood sugar levels:  Prevent low blood sugar by knowing what increases your risk. Ask your healthcare provider for ways to prevent low blood sugar levels. Any of the following can increase your risk of low blood sugar:  Fasting for tests or procedures    During or after intense exercise    Late or postponed meals    Sleeping (you may need a bedtime snack)     Drinking alcohol if you use insulin or insulin releasing pills    Follow up with your doctor as directed:  Write down your questions so you remember to ask them during your visits.  © Copyright [employer]  2023 Information is for End User's use only and may not be sold, redistributed or otherwise used for commercial purposes.  The above information is an  only. It is not intended as medical advice for individual conditions or treatments. Talk to your doctor, nurse or pharmacist before following any medical regimen to see if it is safe and effective for you.

## 2024-01-10 NOTE — PROGRESS NOTES
Name: Brent Oseguera      : 1965      MRN: [id number]  Encounter Provider: DEVON Romo  Encounter Date: 1/10/2024   Encounter department: [facility]    Assessment & Plan     1. Encounter to establish care  -     TSH, 3rd generation with Free T4 reflex; Future; Expected date: 04/10/2024  -     CBC and differential; Future; Expected date: 07/10/2024  -     Comprehensive metabolic panel; Future; Expected date: 07/10/2024  -     Hemoglobin A1C; Future; Expected date: 07/10/2024  -     Lipid panel; Future; Expected date: 07/10/2024  -     Albumin / creatinine urine ratio; Future; Expected date: 07/10/2024    2. Type 2 diabetes mellitus without complication, without long-term current use of insulin (HCC)  Comments:  A1c reported 6.8% 2023. Currently 6.6% today. Switch Biguanide to XR and try to limit GI side effects. Continue Amaryl. Recheck labs in 3 months.  Orders:  -     POCT hemoglobin A1c  -     Ambulatory Referral to Diabetic Education; Future  -     metFORMIN (GLUCOPHAGE-XR) 500 mg 24 hr tablet; Take 2 tablets (1,000 mg total) by mouth daily with breakfast  -     atorvastatin (LIPITOR) 40 mg tablet; Take 1 tablet (40 mg total) by mouth daily  -     glimepiride (AMARYL) 1 mg tablet; Take 1 tablet (1 mg total) by mouth daily with breakfast    3. Mixed hyperlipidemia  Comments:  Evidence of HLD on labs 3/2022: LDL of 154 & chol 221. Pt currently off Lipitor and last lipid check WNL, however, recommend resuming statin for primary prev.    4. History of hepatitis C  Comments:  Completed tx: Mavyret (gelcaprevir/pibrentasvir) for 8 weeks in . HCV RNA undetectable at 4 weeks.    5. Compensated cirrhosis related to hepatitis C virus (HCV)     6. History of tobacco abuse  Comments:  Quit 10/18/2018.    7. History of intravenous drug abuse (HCC)  Comments:  Quit 10+ years ago.    8. Hypertension associated with diabetes   Comments:  Patient off of ACEi: /80 after  "excessive caffeine use. Monitor.    9. Screening for HIV (human immunodeficiency virus)  -     HIV 1/2 AG/AB w Reflex [facility] for 2 yr old and above; Future    10. Encounter for immunization        Depression Screening and Follow-up Plan: Patient was screened for depression during today's encounter. They screened negative with a PHQ-2 score of 1.        Maame Kennedy presents in office today to establish care. His PMH is significant for DM2, HTN, HLD, IVDA c/b hepatitis C infection and cirrhosis. He previously followed with [facility]. Brent presents today to better control his diabetes. See below for progression of illness and subsequent treatment. Brent states he checks his sugar multiple times a day, before and after meals. 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 Brent is currently taking Metformin 500 mg BID and Amaryl 1 mg daily. Hx of DM2 in both parents. +caffeine use: drank 2 monster drinks & 2 cups of coffee this morning. +stress with job and typical life stressors. Denies recent illness or travel. Received eye exam in the last several months - has glasses. Denies CP, SOB, headaches, visual changes, lightheadedness, dizziness, palpitations, diaphoresis, numbness/tingling or weakness.     DM2: dx 3/2022 (A1c noted to be 7.8 on 11/9/21, unable to find encounter related to this - possibly labs from hepatitis clinic) - hospitalized with stroke-like symptoms, found to have A1c >14%. Started on Metformin & Lipitor by ER. Saw PCP in office - also started on Losartan 25 mg. A1c trend since than as follows: >14%, 13.4, 6.4, 6.8 - currently 6.6 in office today.     Pt stopped taking HLD & HTN medications - felt like he was \"being pushed medications without any direction.\" States he was on 2,000 mg of Metformin at one point - 1,000 " BID. States he complained of GI complaints and was dropped to 1500 mg - 500 mg TID. Pt states he then self-discontinued the lunchtime dose as he still wasn't feeling good. Currently, he is taking 500 mg BID along with the Amaryl daily. Meals consistent of consistent breakfast, lunch and dinner. Pt states he used to snack a lot but stopped doing this, however, states he always feels hungry. Eats eggs & scrapple for breakfast with 2 pieces of killer hernán's high fiber bread. States lunch & dinner meals vary. Takes supplements of B, C & D along with multivitamin, cinnamon, berberine, and alpha lipoic acid.    Brent states his sugars the past week have been 104, 115 and 103: average anywhere froms 100s to 160s. States he feels symptomatic in the mid 70s.      Review of Systems   Constitutional:  Negative for chills, fatigue and fever.   HENT:  Negative for congestion, ear discharge, ear pain, facial swelling, rhinorrhea, sinus pressure, sinus pain, sore throat and trouble swallowing.    Eyes:  Negative for photophobia, pain and visual disturbance.   Respiratory:  Negative for cough, chest tightness, shortness of breath and wheezing.    Cardiovascular:  Negative for chest pain, palpitations and leg swelling.   Gastrointestinal:  Negative for abdominal pain, diarrhea, nausea and vomiting.   Genitourinary:  Negative for dysuria, flank pain and hematuria.   Musculoskeletal:  Negative for arthralgias, back pain, myalgias and neck pain.   Skin:  Negative for pallor and wound.   Neurological:  Negative for dizziness, syncope, weakness, numbness and headaches.   Psychiatric/Behavioral:  Negative for confusion and sleep disturbance.    All other systems reviewed and are negative.      Current Outpatient Medications on File Prior to Visit   Medication Sig    [DISCONTINUED] glimepiride (AMARYL) 1 mg tablet Take 1 mg by mouth daily before breakfast    [DISCONTINUED] metFORMIN (GLUCOPHAGE) 1000 MG tablet Take 1 tablet (1,000 mg  total) by mouth 2 (two) times a day with meals (Patient taking differently: Take 500 mg by mouth daily with breakfast)    [DISCONTINUED] ascorbic acid (VITAMIN C) 500 MG tablet Take 500 mg by mouth 2 (two) times a day (Patient not taking: Reported on 1/10/2024)    [DISCONTINUED] atorvastatin (LIPITOR) 40 mg tablet Take 1 tablet (40 mg total) by mouth daily with dinner (Patient not taking: Reported on 1/10/2024)    [DISCONTINUED] ergocalciferol (ERGOCALCIFEROL) 1.25 MG (35207 UT) capsule Take 50,000 Units by mouth (Patient not taking: Reported on 1/10/2024)    [DISCONTINUED] Garlic 580 MG CAPS Take by mouth (Patient not taking: Reported on 1/10/2024)    [DISCONTINUED] Multiple Vitamin (MULTIVITAMIN ADULT PO) Take 1 tablet by mouth daily (Patient not taking: Reported on 1/10/2024)    [DISCONTINUED] OMEGA-3 FATTY ACIDS PO Take by mouth (Patient not taking: Reported on 1/10/2024)       Objective     /80 (BP Location: Left arm, Patient Position: Sitting)   Pulse 82   Temp (!) 96.5 °F (35.8 °C) (Tympanic)   Ht 6' (1.829 m)   Wt 87.7 kg (193 lb 6.4 oz)   SpO2 97%   BMI 26.23 kg/m²     Physical Exam  Vitals reviewed.   Constitutional:       General: He is not in acute distress.     Appearance: Normal appearance. He is well-developed and normal weight. He is not ill-appearing or toxic-appearing.   HENT:      Head: Normocephalic.      Right Ear: Tympanic membrane normal. No middle ear effusion. Tympanic membrane is not erythematous or bulging.      Left Ear: Tympanic membrane normal.  No middle ear effusion. Tympanic membrane is not erythematous or bulging.      Nose: Nose normal. No congestion or rhinorrhea.      Right Sinus: No maxillary sinus tenderness or frontal sinus tenderness.      Left Sinus: No maxillary sinus tenderness or frontal sinus tenderness.      Mouth/Throat:      Mouth: Mucous membranes are moist.      Pharynx: Oropharynx is clear. Uvula midline. No oropharyngeal exudate, posterior  oropharyngeal erythema or uvula swelling.      Tonsils: No tonsillar exudate or tonsillar abscesses.   Eyes:      Extraocular Movements: Extraocular movements intact.      Conjunctiva/sclera: Conjunctivae normal.      Pupils: Pupils are equal, round, and reactive to light.   Neck:      Thyroid: No thyroid mass.   Cardiovascular:      Rate and Rhythm: Normal rate and regular rhythm.      Pulses: Normal pulses.      Heart sounds: Normal heart sounds.   Pulmonary:      Effort: Pulmonary effort is normal. No tachypnea or respiratory distress.      Breath sounds: Normal breath sounds and air entry. No decreased breath sounds, wheezing, rhonchi or rales.   Chest:      Chest wall: No tenderness.   Abdominal:      General: Bowel sounds are normal. There is no distension.      Palpations: Abdomen is soft.      Tenderness: There is no abdominal tenderness. There is no right CVA tenderness, left CVA tenderness, guarding or rebound.   Musculoskeletal:         General: Normal range of motion.      Cervical back: Normal range of motion and neck supple.      Right lower leg: No edema.      Left lower leg: No edema.   Lymphadenopathy:      Cervical: No cervical adenopathy.   Skin:     General: Skin is warm and dry.      Capillary Refill: Capillary refill takes less than 2 seconds.      Findings: No rash.   Neurological:      General: No focal deficit present.      Mental Status: He is alert and oriented to person, place, and time.      Cranial Nerves: No cranial nerve deficit.      Sensory: Sensation is intact.      Motor: Motor function is intact.      Coordination: Coordination is intact.      Gait: Gait is intact.      Deep Tendon Reflexes: Reflexes are normal and symmetric.   Psychiatric:         Attention and Perception: Attention normal.         Mood and Affect: Mood normal.         Speech: Speech normal.         Behavior: Behavior normal. Behavior is cooperative.       DEVON Romo

## 2024-01-11 DIAGNOSIS — E11.9 TYPE 2 DIABETES MELLITUS WITHOUT COMPLICATION, WITHOUT LONG-TERM CURRENT USE OF INSULIN (HCC): ICD-10-CM

## 2024-01-11 RX ORDER — ATORVASTATIN CALCIUM 40 MG/1
40 TABLET, FILM COATED ORAL DAILY
Qty: 90 TABLET | Refills: 0 | Status: SHIPPED | OUTPATIENT
Start: 2024-01-11 | End: 2024-04-10

## 2024-01-23 DIAGNOSIS — E11.9 TYPE 2 DIABETES MELLITUS WITHOUT COMPLICATION, WITHOUT LONG-TERM CURRENT USE OF INSULIN (HCC): ICD-10-CM

## 2024-01-23 RX ORDER — ATORVASTATIN CALCIUM 40 MG/1
40 TABLET, FILM COATED ORAL DAILY
Qty: 90 TABLET | Refills: 0 | OUTPATIENT
Start: 2024-01-23

## 2024-01-29 ENCOUNTER — OFFICE VISIT (OUTPATIENT)
Dept: DIABETES SERVICES | Facility: CLINIC | Age: 59
End: 2024-01-29
Payer: COMMERCIAL

## 2024-01-29 VITALS — WEIGHT: 194.8 LBS | BODY MASS INDEX: 26.42 KG/M2

## 2024-01-29 DIAGNOSIS — E11.9 TYPE 2 DIABETES MELLITUS WITHOUT COMPLICATION, WITHOUT LONG-TERM CURRENT USE OF INSULIN (HCC): Primary | ICD-10-CM

## 2024-01-29 PROCEDURE — 97802 MEDICAL NUTRITION INDIV IN: CPT

## 2024-01-29 NOTE — PATIENT INSTRUCTIONS
Eat 3 meals per day, 4-5 hours apart. No meal skipping.     Eat 45-60 grams of carbohydrate per meal and 15-30 grams per snack.    Initiate regular exercise to build to at least 30 minutes per day or 150 minutes of moderate exercise per week, and 2 days per week of resistance/strength training, pending physician approval.    Complete 3-day food log and return completed log at the follow up appointment

## 2024-01-29 NOTE — PROGRESS NOTES
Medical Nutrition Therapy        Assessment    Visit Type: Initial visit  Chief complaint/Medical Diagnosis/reason for visit E11.9    HPI Brent was seen in person for the initial MNT appointment. Patient was pleasant and willing to share assessment information. BG levels are checked 6x/day. FBG in the morning is usually 130 mg/dL and before and 2 hours after the meal is often  mg/dL. Overall, Brent has good control over his BG levels. His A1 was close to 14% in 2022 but now is 6.6% (on 1/10/24). Patient does take diabetes medication as prescribed.     Brent expressed an interest in learning. He asked several nutrition and diabetes-related questions.    Patient reported a current exercise regimen of stretches, push ups, sit-ups, and chair dips 3x weekly for about 10-15 minutes. Brent has a gym membership that he does not use. Explained how exercise lowers BG levels by creating more demand for glucose in the muscle cells. Encouraged addition of more exercise as safe and able.    A challenge to eating appropriately to manage diabetes is Brent's fluctuating work schedule as a  at [employer].     Problems identified in food recall include meal skipping, inconsistent carbohydrate intake, excessive caffeine intake, high-fat and high-sodium convenience foods, high-fat dairy, limited non-starchy vegetables and whole grains. Consumption of carbohydrates ranges from 0 to 60 grams per meal. Explained basic pathophysiology of diabetes and impact of diet on blood glucose levels and disease complications. Explained how sodium influences volume retention, blood pressure, and complications for heart disease, stroke, and CKD.     Provided patient with a 2092 calorie meal plan to assist with consistency, balance and portion control.  Encouraged the consumption of regular meals at regular times.  Advised patient to keep carbohydrate intake to 45-60 grams per meal and 15-30 per snack to assist with glycemic  control.  Suggested keeping protein intake to 10 ounces a day and fat to 5 servings daily to assist with lipid management and calorie control. Portion booklet and food labels were used to teach basic carbohydrate counting. Patient agreed to keep daily food logs and return them in 2 months for assessment. RD will remain available for further dietary questions/concerns.     Ht Readings from Last 1 Encounters:   01/10/24 6' (1.829 m)     Wt Readings from Last 3 Encounters:   01/10/24 87.7 kg (193 lb 6.4 oz)   03/15/22 76.6 kg (168 lb 14 oz)     Weight Change: No    Barriers to Learning: hearing    Do you follow any special diet presently?: No  Who shops: spouse  Who cooks: patient and spouse    Food Log: Completed via the method of food recall    Wakes up 8 am (earliest 3 am; latest 8 am-- due to work schedule  at [employer])    Breakfast:4:30 or 9:30 am; 2 cups (10 oz) coffee (reg; w/ stevia and half & half) w/ fred seed mix (4-5 oz unsweetened almond milk, stevia, vanilla) with scrapple or sausage or ham with 2 fried eggs with 2 bread (Sal Killer 21 multigrain) with butter   Morning Snack:none  Lunch:6 hrs later; skips; sandwich (ham, turkey, turkey with avina) with a banana or indiv rice pudding (no sugar added) with SF Monster   Afternoon Snack: 3-4 hrs after lunch; sometimes, peanut butter cracker packet OR handful of nuts  Dinner:4-5 pm or up to 10:30 pm; meat (pork; no breading or bbq) and veg (mixed-broccoli, cauliflower, squash, zucchini) and sometimes 2-3 perogies or 1 c noodles   Evening Snack:8 pm; 1/2 tub cheese puff balls  Beverages: SF Monsters (6-10 cans)  Eating out/Take out:1x/ month; [employer] [place]- avina double cheeseburger and hot dog and 3 oz fries with 1/2 of a small milk shake  Exercise 3x/week for 10-15 minutes- stretches, push ups, sit-ups, chair dips    Calorie needs 2092 kcals/day Carbs: 45-60 g/meal, 15-30 g/snack     Protein:10 ounces/day    Fat: 5 servings/day         Nutrition Diagnosis:  Food and nutrition related knowledge deficit  related to Lack of prior exposure to accurate nutrition related information as evidenced by Verbalizes inaccurate or incomplete information    Intervention: plate method, increased fiber intake, label reading, carbohydrate counting, meal timing, meal planning, exercise guidelines, and food diary     Treatment Goals: Patient understands education and recommendations, Patient will monitor food intake daily with tracker, Patient will consume 3 meals a day, Patient will increase their intake of plant based foods, Patient will count carbohydrates, Patient will exercise, and Patient will monitor blood glucose    Monitoring and evaluation:    Term code indicator  FH 1.3.2 Food Intake Criteria: Eat 3 meals per day, 4-5 hours apart. No meal skipping.   Term code indicator  FH 1.6.3 Carbohydrate Intake Criteria: Eat 45-60 grams of carbohydrate per meal and 15-30 grams per snack.  Term code indicator  CH 2.2 Treatments/Therapy/Alternative Medicine Criteria: Initiate regular exercise to build to at least 30 minutes per day or 150 minutes of moderate exercise per week, and 2 days per week of resistance/strength training, pending physician approval.    Materials Provided: portion book, 3-day food log    Patient’s Response to Instruction:  Comprehensiongood  Motivationgood  Expected Compliancegood    Begin Time: 1:00 pm  End Time: 2:18 pm  Referring Provider: DEVON Romo    Thank you for coming to the [facility] for education today.  Please feel free to call with any questions or concerns.    Neeru Mora, RD  [address]  [place] [address]  [phone]

## 2024-02-05 ENCOUNTER — OFFICE VISIT (OUTPATIENT)
Dept: DIABETES SERVICES | Facility: CLINIC | Age: 59
End: 2024-02-05
Payer: COMMERCIAL

## 2024-02-05 ENCOUNTER — TELEPHONE (OUTPATIENT)
Dept: DIABETES SERVICES | Facility: CLINIC | Age: 59
End: 2024-02-05

## 2024-02-05 VITALS — BODY MASS INDEX: 26.15 KG/M2 | WEIGHT: 192.8 LBS

## 2024-02-05 DIAGNOSIS — E11.9 TYPE 2 DIABETES MELLITUS WITHOUT COMPLICATION, WITHOUT LONG-TERM CURRENT USE OF INSULIN (HCC): Primary | ICD-10-CM

## 2024-02-05 PROCEDURE — G0108 DIAB MANAGE TRN  PER INDIV: HCPCS

## 2024-02-05 PROCEDURE — 98960 EDU&TRN PT SELF-MGMT NQHP 1: CPT

## 2024-02-05 NOTE — PROGRESS NOTES
"Type 2 Diabetes Class Assessment    HPI: Met with Brent Oseguera for DSME Initial visit. Brent has Type 2 Diabetes.    Diabetes Assessment  Visit Type: Initial visit  Present at Session: patient   Medical Diagnosis/ICD 10: E11.9  Special Learning Needs: No  Barriers to Learning: no barriers    How do you learn best? Hands on  What are you most interested in learning about regards to your diabetes? The \"who, what, when, why\" about diabetes and also regarding food, how to not be on medications  How do you feel about making lifestyle changes at this time? All for changes  How would you rate your current knowledge of diabetes? poor  How confident are you that will be able to take better control of your diabetes?: excellent    How long have you had diabetes? 2 years  Have you had diabetes education in the past?: Yes - MNT w/ diabetes educator  Do you have any family members with diabetes?: Yes - mother and father  Do you monitor your blood sugar? yes  Type of blood sugar monitor: [employer]ongo   How old is your meter?: a few months old  How often do you test your blood sugars?:6x daily  Do you keep a written record of your blood sugars? Yes   Blood sugar log with patient today and reviewed by educator?: Yes   Blood Sugar ranges:    Fastin-149 mg/dL   Before meals: n/a   2 hours after meals:  mg/dL  Any financial concerns pertaining to your diabetes supplies, medication or care?: No  Have you ever experienced hypoglycemia?:  No  Have you ever been hospitalized or gone to the ER due to your blood sugars?: Yes - 2 years ago  How do you treat low blood sugars?: not a candy bar, educated  How do you treat high blood sugars? Walking, exercise, water  Do you wear a Diabetes I.D.?: no  Where do you dispose of your sharps (needles,lancetes)?: paper towel and then trash; educated    Lab Results   Component Value Date    HGBA1C 6.6 (A) 01/10/2024    HGBA1C 6.4 (H) 2022    HGBA1C >14.0 (H) 03/15/2022    HGBA1C 13.4 (H) " "03/15/2022       No results found for: \"CHOL\"  Lab Results   Component Value Date    HDL 48 03/16/2022     Lab Results   Component Value Date    LDLCALC 154 (H) 03/16/2022     Lab Results   Component Value Date    TRIG 95 03/16/2022     No results found for: \"CHOLHDL\"  No results found for: \"MICROALBUR\", \"[id number]\"    There is no height or weight on file to calculate BMI.    Ht Readings from Last 1 Encounters:   01/10/24 6' (1.829 m)     Wt Readings from Last 1 Encounters:   01/29/24 88.4 kg (194 lb 12.8 oz)     Weight Change: No    Diet Assessment    Do you follow any special diet presently?: No  Who cooks at home?:  patient  Who does the grocery shopping?: spouse   How frequently do you eat out?: next to never    Activity Assessment    Exercise: dumbbell exercises, push-ups, dips    Lifestyle/Social Assessment    Racial/ethnic group:                                       Primary Language: English  Marital Status:   Education Level: Some College (No Degree)  Work status: Full Time  Type of job and hours:  and hours vary  Who lives in your household?: spouse  Who is you primary support person(s): spouse   Describe your quality of life currently?: good  Any concerns for your safety?: No  Any Rastafari or cultural practices that may affect your diabetes care: No  Do you have a decrease or loss of hearing?: Yes  Do you have a decrease or loss of vision?: Yes  When was the last time you had an ophthalmology exam?: Oct-Nov 2023  When was the last time you had dental exam?: a few years ago  Do you check your feet for cracks, sores, debris?: Yes  When was the last time you had podiatry or foot exam?: 2 years ago  Last flu shot?: none  Pneumonia shot?: No    The patient's history was reviewed and updated as appropriate: allergies, current medications.    Intervention    Diabetes Overview :   Brent was instructed on basic concepts of diabetes, including identifying role of diabetes self " management, basic pathophysiology and types of diabetes, A1c and blood sugar targets. Brent has good understanding of material covered.    Taking Medications: Instructed patient on action, side effects, efficacy, prescribed dosage and appropriate timing and frequency of administration of his diabetes medication. Brent has good understanding of material covered.     Monitoring Blood Sugars  Instructions for Meter Teaching- Patient instructed in the following:  Site selection and skin preparation, Loading strips and lancet device, meter activation, obtaining blood sample, test strip and lancet disposal and recording log book entries. Patient has good understanding of material covered and was able to test their own blood sugar in office today.     Comments: Gave and instructed on One Touch Verio Flex meter, Patient demonstrated use, blood sugar in office 107 mg/dl  at  10:23 am.        Testing frequency: Encouraged pair testing. Test sugars before a meal and 2hr after the same meal, rotating between breakfast, lunch, and dinner. Test sugars twice a day (3 days a week, 7 days a week).     Goal Blood Sugars:   Premeal , even better <110  2hr after a meal <180, even better <140  A1C <7%, even better <6.5%.    Hypoglycemia: Instructed patient on definition/risk of hypoglycemia, treatment, causes/symptoms, when to notify provider of lows, prevention of hypoglycemia and exercise precautions.  Comments: Brent verbalizes understanding of hypoglycemia concepts      Physical Activity: Discussed benefits of physical activity to optimize blood glucose control, encouraged activity at patient is physically able. Always consult a physician prior to starting an exercise program.  Comments: Brent verbalizes understanding of hypoglycemia concepts        Diabetes Education Record  Brent received the following handouts: Protect Your Eyes, Tips for Healthy Eyes, Foot Care for People with Diabetes, PA Household Sharps Disposal,  Diabetes Guidelines, A1c Magnet, 15/15 Rule, Hyperglycemia, Hypoglycemia, Know Your Blood Glucose Numbers, Blood Glucose Tracker sheet       Patient response to instruction    Comprehensiongood  Motivationgood  Expected Compliancegood  Response to Teachback: 100%, demonstrated understanding    Begin Time: 9:46 am  End Time: 10:54 am  Referring Provider: DEVON Romo    Thank you for referring your patient to [facility] [place], it was a pleasure working with them today. Please feel free to call with any questions or concerns.    Neeru Mora, RD  [address] [place] [address]  [phone]

## 2024-02-05 NOTE — PATIENT INSTRUCTIONS
Your chosen classes are Feb 7th, 14th, 21st, 28th from 9:30-11:30 am.     [address]  [place] [address]

## 2024-02-07 ENCOUNTER — OFFICE VISIT (OUTPATIENT)
Dept: DIABETES SERVICES | Facility: CLINIC | Age: 59
End: 2024-02-07
Payer: COMMERCIAL

## 2024-02-07 DIAGNOSIS — E11.9 TYPE 2 DIABETES MELLITUS WITHOUT COMPLICATION, WITHOUT LONG-TERM CURRENT USE OF INSULIN (HCC): Primary | ICD-10-CM

## 2024-02-07 PROCEDURE — 98961 EDU&TRN PT SLF-MGMT NQHP 2-4: CPT

## 2024-02-07 NOTE — PROGRESS NOTES
Living Well with Diabetes Group Class #1    Brent Oseguera attended the Living Well with Diabetes Group Class #1.    Topics Covered in class today include: What is diabetes; Types of Diabetes; How Diabetes is diagnosed; Management skills; the role of exercise in blood sugar managements, Home glucose monitoring, and target ranges.    Brent participated in group activities    The patient's history was reviewed and updated as appropriate: allergies, current medications.    Lab Results   Component Value Date    HGBA1C 6.6 (A) 01/10/2024       Diabetes Education Record  Brent was provided Living Well with Diabetes Class #1 book      Patient response to instruction    Comprehensiongood  Motivationgood  Expected Compliancegood    Begin Time: 9:30 am  End Time: 11:30 am  Referring Provider: DEVON Romo     Thank you for referring your patient to [facility], it was a pleasure working with them today. Please feel free to call with any questions or concerns.    Neeru Mora, RD  [address] [place] [address]  [phone]

## 2024-02-14 ENCOUNTER — OFFICE VISIT (OUTPATIENT)
Dept: DIABETES SERVICES | Facility: CLINIC | Age: 59
End: 2024-02-14
Payer: COMMERCIAL

## 2024-02-14 DIAGNOSIS — E11.9 TYPE 2 DIABETES MELLITUS WITHOUT COMPLICATION, WITHOUT LONG-TERM CURRENT USE OF INSULIN (HCC): Primary | ICD-10-CM

## 2024-02-14 PROCEDURE — 98961 EDU&TRN PT SLF-MGMT NQHP 2-4: CPT

## 2024-02-14 NOTE — PROGRESS NOTES
Living Well with Diabetes Group Class #2    Brent Ana Rosa attended the Living Well with Diabetes Group Class #2.    During class, Brent was instructed on the following topics: Macronutrients, Carbohydrate sources, What one serving of carbohydrate equals, effects of diet on blood glucose levels, effect of carbohydrates on blood glucose levels, basics of meal planning: balance, portions, meal times, measurements, reading food labels to determine carbohydrates.     Brent participated in group activities of reading labels together and completing the meal planning activity.     Brent will follow up with class #3. Will call with any questions or concerns prior to next session.     The patient's history was reviewed and updated as appropriate: allergies, current medications.    Lab Results   Component Value Date    HGBA1C 6.6 (A) 01/10/2024       Diabetes Education Record  Brent was provided Living Well with Diabetes Class #2 book      Patient response to instruction    Comprehensiongood  Motivationgood  Expected Compliancegood    Begin Time: 9:00 am  End Time: 11:00 am  Referring Provider: DEVON Romo     Thank you for referring your patient to [facility], it was a pleasure working with them today. Please feel free to call with any questions or concerns.    Neeru Mora, RD  [address] [place] [address]  [phone]

## 2024-02-28 ENCOUNTER — OFFICE VISIT (OUTPATIENT)
Dept: DIABETES SERVICES | Facility: CLINIC | Age: 59
End: 2024-02-28
Payer: COMMERCIAL

## 2024-02-28 DIAGNOSIS — E11.9 TYPE 2 DIABETES MELLITUS WITHOUT COMPLICATION, WITHOUT LONG-TERM CURRENT USE OF INSULIN (HCC): Primary | ICD-10-CM

## 2024-02-28 PROCEDURE — 98961 EDU&TRN PT SLF-MGMT NQHP 2-4: CPT

## 2024-02-28 NOTE — PROGRESS NOTES
Living Well with Diabetes Group Class #3    Brentjairo Oseguera attended the Living Well with Diabetes Group Class #3.    During class, Brent was instructed on the following topics: Oral and injectable medications, short term complications of diabetes, long term complications of diabetes, prevention of complications, foot care, sick day management, stress management, and traveling with diabetes. Brent participated in group activities.    Brent will follow up with class #4. Will call with any questions or concerns prior to next session.     The patient's history was reviewed and updated as appropriate: allergies, current medications.    Lab Results   Component Value Date    HGBA1C 6.6 (A) 01/10/2024       Diabetes Education Record  Brent was provided Living Well with Diabetes Class #3 book      Patient response to instruction    Comprehensiongood  Motivationgood  Expected Compliancegood    Begin Time: 9:30 am  End Time: 11:30 am  Referring Provider: DEVON Romo    Thank you for referring your patient to [facility], it was a pleasure working with them today. Please feel free to call with any questions or concerns.    Neeru Mora, RD  [address] [place] [address]  [phone]

## 2024-02-28 NOTE — ASSESSMENT & PLAN NOTE
----- Message from Magalis Min MD -----  Paracentesis fluid results.  Please inform patient results are OK.     Will send message to Patient's Portal.   · With cirrhosis of the liver  · The patient follows with GI outpatient  · This was treated

## 2024-03-06 ENCOUNTER — OFFICE VISIT (OUTPATIENT)
Dept: DIABETES SERVICES | Facility: CLINIC | Age: 59
End: 2024-03-06
Payer: COMMERCIAL

## 2024-03-06 DIAGNOSIS — E11.9 TYPE 2 DIABETES MELLITUS WITHOUT COMPLICATION, WITHOUT LONG-TERM CURRENT USE OF INSULIN (HCC): Primary | ICD-10-CM

## 2024-03-06 PROCEDURE — 98961 EDU&TRN PT SLF-MGMT NQHP 2-4: CPT

## 2024-03-06 NOTE — PROGRESS NOTES
Living Well with Diabetes Group Class #4    Brent Oseguera attended the Living Well with Diabetes Group Class #4.    During class, Brent was instructed on the following topics: Types of cholesterol, dietary sources of cholesterol, types of fat, types of fiber, reading food labels- in depth, healthy choices when dining out.     Brent participated in group activities of reading labels together and completed the dining out activity.     Brent will follow up with class #5 or additional DSMT/MNT as desired. Will call with any questions or concerns prior to next session.     The patient's history was reviewed and updated as appropriate: allergies, current medications.    Lab Results   Component Value Date    HGBA1C 6.6 (A) 01/10/2024       Diabetes Education Record  Brent was provided Living Well with Diabetes Class #4 book      Patient response to instruction    Comprehensiongood  Motivationgood  Expected Compliancegood    Begin Time: 9:30 am  End Time: 11:30 am  Referring Provider: DEVON Romo     Thank you for referring your patient to [facility], it was a pleasure working with them today. Please feel free to call with any questions or concerns.    Neeru Mora, RD  [address] [place] [address]  [phone]  
Normal rate, regular rhythm.  Heart sounds S1, S2.  No murmurs, rubs or gallops.
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
## 2024-03-31 DIAGNOSIS — E11.9 TYPE 2 DIABETES MELLITUS WITHOUT COMPLICATION, WITHOUT LONG-TERM CURRENT USE OF INSULIN (HCC): ICD-10-CM

## 2024-04-01 ENCOUNTER — TELEPHONE (OUTPATIENT)
Dept: DIABETES SERVICES | Facility: CLINIC | Age: 59
End: 2024-04-01

## 2024-04-01 ENCOUNTER — OFFICE VISIT (OUTPATIENT)
Dept: DIABETES SERVICES | Facility: CLINIC | Age: 59
End: 2024-04-01
Payer: COMMERCIAL

## 2024-04-01 VITALS — BODY MASS INDEX: 26.34 KG/M2 | WEIGHT: 194.2 LBS

## 2024-04-01 DIAGNOSIS — E11.9 TYPE 2 DIABETES MELLITUS WITHOUT COMPLICATION, WITHOUT LONG-TERM CURRENT USE OF INSULIN (HCC): Primary | ICD-10-CM

## 2024-04-01 PROCEDURE — 97803 MED NUTRITION INDIV SUBSEQ: CPT

## 2024-04-01 RX ORDER — ATORVASTATIN CALCIUM 40 MG/1
40 TABLET, FILM COATED ORAL DAILY
Qty: 90 TABLET | Refills: 0 | Status: SHIPPED | OUTPATIENT
Start: 2024-04-01

## 2024-04-01 NOTE — PROGRESS NOTES
Medical Nutrition Therapy        Assessment    Visit Type: Follow-up visit  Chief complaint/Medical Diagnosis/reason for visit E11.9    HPI Brent was seen in person for the follow-up MNT appointment. Patient was pleasant and willing to share assessment information. BG levels are checked 3x/day. FBG in the morning is usually 120-130s. Patient does not take all medication as prescribed. Discussed adding atorvastatin to daily regimen as directed by PCP. Set this as a goal.    Reviewed labs and gave recommendations to improve cholesterol panel. Discussed GFR and is at goal. No UCAR available to review.    Patient reported a current exercise regimen. Daily performs push-ups and dips and stretches. Also uses free weights. Would like to start cardio classes, although his work schedule is a barrier to initiation.    Problems identified in food recall include inconsistent carbohydrate intake, high saturated fat protein, limited non-starchy vegetables. Consumption of carbohydrates ranges from 50 to ~70 grams per meal.     Portion booklet and food labels were used to teach basic carbohydrate counting. Patient agreed to keep daily food logs and return them in 6 months for assessment. RD will remain available for further dietary questions/concerns.     Ht Readings from Last 1 Encounters:   01/10/24 6' (1.829 m)     Wt Readings from Last 3 Encounters:   02/05/24 87.5 kg (192 lb 12.8 oz)   01/29/24 88.4 kg (194 lb 12.8 oz)   01/10/24 87.7 kg (193 lb 6.4 oz)     Weight Change: No    Barriers to Learning: no barriers    Do you follow any special diet presently?: No  Who shops: patient and spouse  Who cooks: patient and spouse    Food Log: Please see scanned log      Calorie needs 2092 kcals/day Carbs: 45-60 g/meal, 15-30 g/snack     Protein:10 ounces/day    Fat: 5 servings/day        Nutrition Diagnosis:  Inconsistent carbohydrate intake  intake related to Physiological causes requiring careful timing and consistency in the amount  of carbohydrate (i.e. diabetes mellitus, hypoglycemia) as evidenced by  Conditions associated with a diagnosis or treatment (i.e. diabetes mellitus, obesity, metabolic syndrome, hypoglycemia)    Intervention: plate method, label reading, carbohydrate counting, meal timing, meal planning, exercise guidelines, and food diary     Treatment Goals: Patient understands education and recommendations, Patient will monitor food intake daily with tracker, Patient will consume 3 meals a day, Patient will increase their intake of plant based foods, Patient will count carbohydrates, Patient will exercise, and Patient will monitor blood glucose    Monitoring and evaluation:    Term code indicator  CH 2.2 Treatments/Therapy/Alternative Medicine Criteria: Start taking your atorvastatin as directed.  Term code indicator  FH 1.3.2 Food Intake Criteria: Continue following the meal plan.     Materials Provided: 3-day food log    Patient’s Response to Instruction:  Comprehensiongood  Motivationgood  Expected Compliancegood    Begin Time: 9:26 am  End Time: 10:20 am  Referring Provider: DEVON Romo    Thank you for coming to the [facility] for education today.  Please feel free to call with any questions or concerns.    Neeru Mora, RD  [address] [place] [address]  [phone]

## 2024-04-29 ENCOUNTER — TELEPHONE (OUTPATIENT)
Dept: DIABETES SERVICES | Facility: CLINIC | Age: 59
End: 2024-04-29

## 2024-04-29 NOTE — TELEPHONE ENCOUNTER
Called patient and LM to follow-up on the goal achievement set for the Living Well with Diabetes classes.

## 2024-06-05 ENCOUNTER — TELEPHONE (OUTPATIENT)
Dept: DIABETES SERVICES | Facility: CLINIC | Age: 59
End: 2024-06-05

## 2024-06-05 NOTE — TELEPHONE ENCOUNTER
Called Brent and documented the achievement status of the Living Well with Diabetes goals provided by the patient.

## 2024-06-12 ENCOUNTER — APPOINTMENT (OUTPATIENT)
Dept: URGENT CARE | Facility: CLINIC | Age: 59
End: 2024-06-12

## 2024-06-25 DIAGNOSIS — E11.9 TYPE 2 DIABETES MELLITUS WITHOUT COMPLICATION, WITHOUT LONG-TERM CURRENT USE OF INSULIN (HCC): ICD-10-CM

## 2024-06-25 RX ORDER — ATORVASTATIN CALCIUM 40 MG/1
40 TABLET, FILM COATED ORAL DAILY
Qty: 90 TABLET | Refills: 0 | Status: SHIPPED | OUTPATIENT
Start: 2024-06-25

## 2024-06-25 RX ORDER — METFORMIN HYDROCHLORIDE 500 MG/1
1000 TABLET, EXTENDED RELEASE ORAL
Qty: 90 TABLET | Refills: 1 | Status: SHIPPED | OUTPATIENT
Start: 2024-06-25

## 2024-10-07 ENCOUNTER — APPOINTMENT (OUTPATIENT)
Dept: LAB | Facility: CLINIC | Age: 59
End: 2024-10-07
Payer: COMMERCIAL

## 2024-10-07 DIAGNOSIS — Z76.89 ENCOUNTER TO ESTABLISH CARE: ICD-10-CM

## 2024-10-07 DIAGNOSIS — Z11.4 SCREENING FOR HIV (HUMAN IMMUNODEFICIENCY VIRUS): ICD-10-CM

## 2024-10-07 LAB
ALBUMIN SERPL BCG-MCNC: 4.7 G/DL (ref 3.5–5)
ALP SERPL-CCNC: 81 U/L (ref 34–104)
ALT SERPL W P-5'-P-CCNC: 38 U/L (ref 7–52)
ANION GAP SERPL CALCULATED.3IONS-SCNC: 8 MMOL/L (ref 4–13)
AST SERPL W P-5'-P-CCNC: 26 U/L (ref 13–39)
BASOPHILS # BLD AUTO: 0.05 THOUSANDS/ΜL (ref 0–0.1)
BASOPHILS NFR BLD AUTO: 1 % (ref 0–1)
BILIRUB SERPL-MCNC: 0.49 MG/DL (ref 0.2–1)
BUN SERPL-MCNC: 13 MG/DL (ref 5–25)
CALCIUM SERPL-MCNC: 9.6 MG/DL (ref 8.4–10.2)
CHLORIDE SERPL-SCNC: 101 MMOL/L (ref 96–108)
CHOLEST SERPL-MCNC: 205 MG/DL
CO2 SERPL-SCNC: 31 MMOL/L (ref 21–32)
CREAT SERPL-MCNC: 0.97 MG/DL (ref 0.6–1.3)
CREAT UR-MCNC: 143.7 MG/DL
EOSINOPHIL # BLD AUTO: 0.14 THOUSAND/ΜL (ref 0–0.61)
EOSINOPHIL NFR BLD AUTO: 2 % (ref 0–6)
ERYTHROCYTE [DISTWIDTH] IN BLOOD BY AUTOMATED COUNT: 13.1 % (ref 11.6–15.1)
EST. AVERAGE GLUCOSE BLD GHB EST-MCNC: 163 MG/DL
GFR SERPL CREATININE-BSD FRML MDRD: 86 ML/MIN/1.73SQ M
GLUCOSE P FAST SERPL-MCNC: 114 MG/DL (ref 65–99)
HBA1C MFR BLD: 7.3 %
HCT VFR BLD AUTO: 48.1 % (ref 36.5–49.3)
HDLC SERPL-MCNC: 48 MG/DL
HGB BLD-MCNC: 15.9 G/DL (ref 12–17)
HIV 1+2 AB+HIV1 P24 AG SERPL QL IA: NORMAL
HIV 2 AB SERPL QL IA: NORMAL
HIV1 AB SERPL QL IA: NORMAL
HIV1 P24 AG SERPL QL IA: NORMAL
IMM GRANULOCYTES # BLD AUTO: 0.02 THOUSAND/UL (ref 0–0.2)
IMM GRANULOCYTES NFR BLD AUTO: 0 % (ref 0–2)
LDLC SERPL CALC-MCNC: 118 MG/DL (ref 0–100)
LYMPHOCYTES # BLD AUTO: 2.57 THOUSANDS/ΜL (ref 0.6–4.47)
LYMPHOCYTES NFR BLD AUTO: 33 % (ref 14–44)
MCH RBC QN AUTO: 29.2 PG (ref 26.8–34.3)
MCHC RBC AUTO-ENTMCNC: 33.1 G/DL (ref 31.4–37.4)
MCV RBC AUTO: 88 FL (ref 82–98)
MICROALBUMIN UR-MCNC: 20.1 MG/L
MICROALBUMIN/CREAT 24H UR: 14 MG/G CREATININE (ref 0–30)
MONOCYTES # BLD AUTO: 0.7 THOUSAND/ΜL (ref 0.17–1.22)
MONOCYTES NFR BLD AUTO: 9 % (ref 4–12)
NEUTROPHILS # BLD AUTO: 4.24 THOUSANDS/ΜL (ref 1.85–7.62)
NEUTS SEG NFR BLD AUTO: 55 % (ref 43–75)
NONHDLC SERPL-MCNC: 157 MG/DL
NRBC BLD AUTO-RTO: 0 /100 WBCS
PLATELET # BLD AUTO: 274 THOUSANDS/UL (ref 149–390)
PMV BLD AUTO: 10.4 FL (ref 8.9–12.7)
POTASSIUM SERPL-SCNC: 4.6 MMOL/L (ref 3.5–5.3)
PROT SERPL-MCNC: 8 G/DL (ref 6.4–8.4)
RBC # BLD AUTO: 5.44 MILLION/UL (ref 3.88–5.62)
SODIUM SERPL-SCNC: 140 MMOL/L (ref 135–147)
TRIGL SERPL-MCNC: 195 MG/DL
TSH SERPL DL<=0.05 MIU/L-ACNC: 0.99 UIU/ML (ref 0.45–4.5)
WBC # BLD AUTO: 7.72 THOUSAND/UL (ref 4.31–10.16)

## 2024-10-07 PROCEDURE — 80053 COMPREHEN METABOLIC PANEL: CPT

## 2024-10-07 PROCEDURE — 82043 UR ALBUMIN QUANTITATIVE: CPT

## 2024-10-07 PROCEDURE — 80061 LIPID PANEL: CPT

## 2024-10-07 PROCEDURE — 87389 HIV-1 AG W/HIV-1&-2 AB AG IA: CPT

## 2024-10-07 PROCEDURE — 85025 COMPLETE CBC W/AUTO DIFF WBC: CPT

## 2024-10-07 PROCEDURE — 83036 HEMOGLOBIN GLYCOSYLATED A1C: CPT

## 2024-10-07 PROCEDURE — 82570 ASSAY OF URINE CREATININE: CPT

## 2024-10-07 PROCEDURE — 84443 ASSAY THYROID STIM HORMONE: CPT

## 2024-10-07 PROCEDURE — 36415 COLL VENOUS BLD VENIPUNCTURE: CPT

## 2024-10-11 ENCOUNTER — OFFICE VISIT (OUTPATIENT)
Dept: FAMILY MEDICINE CLINIC | Facility: CLINIC | Age: 59
End: 2024-10-11
Payer: COMMERCIAL

## 2024-10-11 VITALS
TEMPERATURE: 98.4 F | HEIGHT: 72 IN | WEIGHT: 192.4 LBS | SYSTOLIC BLOOD PRESSURE: 120 MMHG | OXYGEN SATURATION: 99 % | BODY MASS INDEX: 26.06 KG/M2 | HEART RATE: 69 BPM | DIASTOLIC BLOOD PRESSURE: 84 MMHG

## 2024-10-11 DIAGNOSIS — B19.20 COMPENSATED CIRRHOSIS RELATED TO HEPATITIS C VIRUS (HCV)  (HCC): ICD-10-CM

## 2024-10-11 DIAGNOSIS — Z12.11 SCREENING FOR COLON CANCER: ICD-10-CM

## 2024-10-11 DIAGNOSIS — Z23 ENCOUNTER FOR IMMUNIZATION: ICD-10-CM

## 2024-10-11 DIAGNOSIS — E11.9 TYPE 2 DIABETES MELLITUS WITHOUT COMPLICATION, WITHOUT LONG-TERM CURRENT USE OF INSULIN (HCC): ICD-10-CM

## 2024-10-11 DIAGNOSIS — Z87.898 HISTORY OF INTRAVENOUS DRUG ABUSE: ICD-10-CM

## 2024-10-11 DIAGNOSIS — E78.2 MIXED HYPERLIPIDEMIA: ICD-10-CM

## 2024-10-11 DIAGNOSIS — Z00.00 ANNUAL PHYSICAL EXAM: Primary | ICD-10-CM

## 2024-10-11 DIAGNOSIS — F17.211 NICOTINE DEPENDENCE, CIGARETTES, IN REMISSION: ICD-10-CM

## 2024-10-11 DIAGNOSIS — Z12.5 SCREENING FOR PROSTATE CANCER: ICD-10-CM

## 2024-10-11 DIAGNOSIS — B35.1 TOENAIL FUNGUS: ICD-10-CM

## 2024-10-11 DIAGNOSIS — K74.69 COMPENSATED CIRRHOSIS RELATED TO HEPATITIS C VIRUS (HCV)  (HCC): ICD-10-CM

## 2024-10-11 DIAGNOSIS — Z86.19 HISTORY OF HEPATITIS C: ICD-10-CM

## 2024-10-11 LAB
LEFT EYE DIABETIC RETINOPATHY: NORMAL
LEFT EYE IMAGE QUALITY: NORMAL
LEFT EYE MACULAR EDEMA: NORMAL
LEFT EYE OTHER RETINOPATHY: NORMAL
RIGHT EYE DIABETIC RETINOPATHY: NORMAL
RIGHT EYE IMAGE QUALITY: NORMAL
RIGHT EYE MACULAR EDEMA: NORMAL
RIGHT EYE OTHER RETINOPATHY: NORMAL
SEVERITY (EYE EXAM): NORMAL

## 2024-10-11 PROCEDURE — 99214 OFFICE O/P EST MOD 30 MIN: CPT

## 2024-10-11 PROCEDURE — 99396 PREV VISIT EST AGE 40-64: CPT

## 2024-10-11 PROCEDURE — 92250 FUNDUS PHOTOGRAPHY W/I&R: CPT

## 2024-10-11 RX ORDER — METFORMIN HCL 500 MG
1000 TABLET, EXTENDED RELEASE 24 HR ORAL
Qty: 90 TABLET | Refills: 3 | Status: SHIPPED | OUTPATIENT
Start: 2024-10-11

## 2024-10-11 RX ORDER — GLIMEPIRIDE 1 MG/1
1 TABLET ORAL
Qty: 90 TABLET | Refills: 3 | Status: SHIPPED | OUTPATIENT
Start: 2024-10-11 | End: 2025-10-06

## 2024-10-11 RX ORDER — CICLOPIROX 80 MG/ML
SOLUTION TOPICAL
Qty: 6 ML | Refills: 3 | Status: SHIPPED | OUTPATIENT
Start: 2024-10-11

## 2024-10-11 NOTE — PROGRESS NOTES
Adult Annual Physical  Name: Brent Oseguera      : 1965      MRN: [id number]  Encounter Provider: DEVON Romo  Encounter Date: 10/11/2024   Encounter department: [facility]    Assessment & Plan  Annual physical exam    Labs reviewed. Repeat DM2 labs in 3 months.       Type 2 diabetes mellitus without complication, without long-term current use of insulin (HCC)    Lab Results   Component Value Date    HGBA1C 7.3 (H) 10/07/2024     Counseled.    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)  (HCC)      Orders:    Ambulatory Referral to Gastroenterology; Future    History of intravenous drug abuse    Orders:    Ambulatory Referral to Gastroenterology; Future    Toenail fungus    Orders:    ciclopirox (PENLAC) 8 % solution; Apply topically daily at bedtime    Type 2 diabetes mellitus without complication, without long-term current use of insulin (HCC)    Lab Results   Component Value Date    HGBA1C 7.3 (H) 10/07/2024       Orders:    Diabetic foot exam; Future    IRIS Diabetic eye exam    Albumin / creatinine urine ratio; Future    Comprehensive metabolic panel; Future    Hemoglobin A1C; Future    Lipid Panel with Direct LDL reflex; Future    metFORMIN (GLUCOPHAGE-XR) 500 mg 24 hr tablet; Take 2 tablets (1,000 mg total) by mouth daily with breakfast Patient takes 1 tablet of 500 mg daily    glimepiride (AMARYL) 1 mg  tablet; Take 1 tablet (1 mg total) by mouth daily with breakfast    History of hepatitis C    Orders:    Ambulatory Referral to Gastroenterology; Future    Encounter for immunization    Orders:    HEPATITIS A VACCINE ADULT IM    TDAP VACCINE GREATER THAN OR EQUAL TO 8YO IM    Zoster Vaccine Recombinant IM    influenza vaccine, recombinant, PF, 0.5 mL IM (Flublok)    Screening for colon cancer    Orders:    Ambulatory Referral to Gastroenterology; Future    Cologuard    Nicotine dependence, cigarettes, in remission    Orders:    CT lung screening program; Future    Screening for prostate cancer    Orders:    PSA, Total Screen; Future    Immunizations and preventive care screenings were discussed with patient today. Appropriate education was printed on patient's after visit summary.    Discussed risks and benefits of prostate cancer screening. We discussed the controversial history of PSA screening for prostate cancer in the [place] as well as the risk of over detection and over treatment of prostate cancer by way of PSA screening.  The patient understands that PSA blood testing is an imperfect way to screen for prostate cancer and that elevated PSA levels in the blood may also be caused by infection, inflammation, prostatic trauma or manipulation, urological procedures, or by benign prostatic enlargement.    The role of the digital rectal examination in prostate cancer screening was also discussed and I discussed with him that there is large interobserver variability in the findings of digital rectal examination.    Counseling:  Alcohol/drug use: discussed moderation in alcohol intake, the recommendations for healthy alcohol use, and avoidance of illicit drug use.  Dental Health: discussed importance of regular tooth brushing, flossing, and dental visits.  Injury prevention: discussed safety/seat belts, safety helmets, smoke detectors, carbon monoxide detectors, and smoking near bedding or upholstery.  Sexual  health: discussed sexually transmitted diseases, partner selection, use of condoms, avoidance of unintended pregnancy, and contraceptive alternatives.  Exercise: the importance of regular exercise/physical activity was discussed. Recommend exercise 3-5 times per week for at least 30 minutes.       Depression Screening and Follow-up Plan: Patient was screened for depression during today's encounter. They screened negative with a PHQ-2 score of 0.        History of Present Illness     Adult Annual Physical:  Patient presents for annual physical.   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     Diet and Physical Activity:  - Diet/Nutrition: diabetic diet.  - Exercise: walking.    Depression Screening:  - PHQ-2 Score: 0    General Health:  - Sleep: sleeps well.  - Hearing: normal hearing left ear and normal hearing right ear.  - Vision: goes for regular eye exams and wears glasses.  - Dental: regular dental visits.     Health:  - History of STDs: no.   - Urinary symptoms: none.     Advanced Care Planning:  - Has an advanced directive?: no    - Has a durable medical POA?: no    - ACP document given to patient?: yes      Review of Systems   Constitutional:  Negative for chills, fatigue and fever.   HENT:  Negative for congestion, ear discharge, ear pain, facial swelling, rhinorrhea, sinus pressure, sinus pain, sore throat and trouble swallowing.    Eyes:  Negative for photophobia, pain and visual disturbance.   Respiratory:  Negative for cough, chest  tightness, shortness of breath and wheezing.    Cardiovascular:  Negative for chest pain, palpitations and leg swelling.   Gastrointestinal:  Negative for abdominal pain, diarrhea, nausea and vomiting.   Genitourinary:  Negative for dysuria, flank pain and hematuria.   Musculoskeletal:  Negative for arthralgias, back pain, myalgias and neck pain.   Skin:  Negative for pallor and wound.   Neurological:  Negative for dizziness, syncope, weakness, numbness and headaches.   Psychiatric/Behavioral:  Negative for confusion and sleep disturbance.    All other systems reviewed and are negative.    Past Medical History   Past Medical History:   Diagnosis Date    Cirrhosis (HCC)     Diabetes mellitus (HCC)     Hepatitis C virus infection cured after antiviral drug therapy      Past Surgical History:   Procedure Laterality Date    COLONOSCOPY       Family History   Problem Relation Age of Onset    Diabetes Mother     Diabetes Father      Current Outpatient Medications on File Prior to Visit   Medication Sig Dispense Refill    [DISCONTINUED] glimepiride (AMARYL) 1 mg tablet Take 1 tablet (1 mg total) by mouth daily with breakfast 90 tablet 3    [DISCONTINUED] metFORMIN (GLUCOPHAGE-XR) 500 mg 24 hr tablet Take 2 tablets (1,000 mg total) by mouth daily with breakfast Patient takes 1 tablet of 500 mg daily 90 tablet 1    atorvastatin (LIPITOR) 40 mg tablet TAKE 1 TABLET DAILY (Patient not taking: Reported on 10/11/2024) 90 tablet 0     No current facility-administered medications on file prior to visit.   No Known Allergies   Current Outpatient Medications on File Prior to Visit   Medication Sig Dispense Refill    [DISCONTINUED] glimepiride (AMARYL) 1 mg tablet Take 1 tablet (1 mg total) by mouth daily with breakfast 90 tablet 3    [DISCONTINUED] metFORMIN (GLUCOPHAGE-XR) 500 mg 24 hr tablet Take 2 tablets (1,000 mg total) by mouth daily with breakfast Patient takes 1 tablet of 500 mg daily 90 tablet 1    atorvastatin (LIPITOR) 40  mg tablet TAKE 1 TABLET DAILY (Patient not taking: Reported on 10/11/2024) 90 tablet 0     No current facility-administered medications on file prior to visit.        Objective     /84   Pulse 69   Temp 98.4 °F (36.9 °C) (Tympanic)   Ht 6' (1.829 m)   Wt 87.3 kg (192 lb 6.4 oz)   SpO2 99%   BMI 26.09 kg/m²     Physical Exam  Vitals and nursing note reviewed.   Constitutional:       General: He is not in acute distress.     Appearance: Normal appearance. He is well-developed. He is not ill-appearing.   HENT:      Head: Normocephalic and atraumatic.      Jaw: There is normal jaw occlusion.      Right Ear: Tympanic membrane normal.      Left Ear: Tympanic membrane normal.      Nose: Nose normal.      Mouth/Throat:      Pharynx: Oropharynx is clear. No oropharyngeal exudate or posterior oropharyngeal erythema.   Eyes:      Extraocular Movements: Extraocular movements intact.      Conjunctiva/sclera: Conjunctivae normal.   Neck:      Thyroid: No thyroid mass.      Vascular: No carotid bruit or JVD.      Trachea: Trachea and phonation normal.   Cardiovascular:      Rate and Rhythm: Normal rate and regular rhythm.      Pulses: no weak pulses.           Dorsalis pedis pulses are 2+ on the right side and 2+ on the left side.        Posterior tibial pulses are 2+ on the right side and 2+ on the left side.      Heart sounds: S1 normal and S2 normal. No murmur heard.  Pulmonary:      Effort: Pulmonary effort is normal. No tachypnea or respiratory distress.      Breath sounds: Normal breath sounds and air entry. No stridor. No decreased breath sounds.   Chest:      Chest wall: No tenderness.   Abdominal:      General: Bowel sounds are normal. There is no distension.      Palpations: Abdomen is soft.      Tenderness: There is no abdominal tenderness. There is no right CVA tenderness or left CVA tenderness.   Musculoskeletal:         General: No swelling.      Cervical back: Normal range of motion and neck supple.       Right lower leg: No edema.      Left lower leg: No edema.        Feet:    Feet:      Right foot:      Skin integrity: Callus (great toe) present. No ulcer, skin breakdown, erythema, warmth or dry skin.      Left foot:      Skin integrity: No ulcer, skin breakdown, erythema, warmth, callus or dry skin.   Lymphadenopathy:      Cervical: No cervical adenopathy.   Skin:     General: Skin is warm and dry.      Capillary Refill: Capillary refill takes less than 2 seconds.      Findings: No rash.   Neurological:      General: No focal deficit present.      Mental Status: He is alert and oriented to person, place, and time.      Cranial Nerves: Cranial nerves 2-12 are intact.      Sensory: Sensation is intact.      Motor: Motor function is intact.      Coordination: Coordination is intact.      Gait: Gait is intact.   Psychiatric:         Mood and Affect: Mood normal.         Behavior: Behavior is cooperative.       Diabetic Foot Exam    Patient's shoes and socks removed.    Right Foot/Ankle   Right Foot Inspection  Skin Exam: skin normal, skin intact, callus (great toe) and callus (great toe). No dry skin, no warmth, no erythema, no maceration, no abnormal color, no pre-ulcer and no ulcer.     Toe Exam: ROM and strength within normal limits. Erythema (evidence of fungal disease - gt toenail)    Sensory   Monofilament testing: diminished    Vascular  Capillary refills: < 3 seconds  The right DP pulse is 2+. The right PT pulse is 2+.     Left Foot/Ankle  Left Foot Inspection  Skin Exam: skin normal and skin intact. No dry skin, no warmth, no erythema, no maceration, normal color, no pre-ulcer, no ulcer and no callus.     Toe Exam: ROM and strength within normal limits.     Sensory   Monofilament testing: diminished    Vascular  Capillary refills: < 3 seconds  The left DP pulse is 2+. The left PT pulse is 2+.     Assign Risk Category  No deformity present  Loss of protective sensation  No weak pulses  Risk: 1

## 2025-01-11 ENCOUNTER — APPOINTMENT (OUTPATIENT)
Dept: LAB | Facility: CLINIC | Age: 60
End: 2025-01-11
Payer: COMMERCIAL

## 2025-01-11 DIAGNOSIS — E78.2 MIXED HYPERLIPIDEMIA: ICD-10-CM

## 2025-01-11 DIAGNOSIS — Z12.5 SCREENING FOR PROSTATE CANCER: ICD-10-CM

## 2025-01-11 DIAGNOSIS — E11.9 TYPE 2 DIABETES MELLITUS WITHOUT COMPLICATION, WITHOUT LONG-TERM CURRENT USE OF INSULIN (HCC): ICD-10-CM

## 2025-01-11 LAB
ALBUMIN SERPL BCG-MCNC: 4.8 G/DL (ref 3.5–5)
ALP SERPL-CCNC: 67 U/L (ref 34–104)
ALT SERPL W P-5'-P-CCNC: 45 U/L (ref 7–52)
ANION GAP SERPL CALCULATED.3IONS-SCNC: 8 MMOL/L (ref 4–13)
AST SERPL W P-5'-P-CCNC: 30 U/L (ref 13–39)
BILIRUB SERPL-MCNC: 0.5 MG/DL (ref 0.2–1)
BUN SERPL-MCNC: 19 MG/DL (ref 5–25)
CALCIUM SERPL-MCNC: 9.8 MG/DL (ref 8.4–10.2)
CHLORIDE SERPL-SCNC: 98 MMOL/L (ref 96–108)
CHOLEST SERPL-MCNC: 207 MG/DL (ref ?–200)
CO2 SERPL-SCNC: 30 MMOL/L (ref 21–32)
CREAT SERPL-MCNC: 0.93 MG/DL (ref 0.6–1.3)
CREAT UR-MCNC: 26.3 MG/DL
EST. AVERAGE GLUCOSE BLD GHB EST-MCNC: 160 MG/DL
GFR SERPL CREATININE-BSD FRML MDRD: 90 ML/MIN/1.73SQ M
GLUCOSE P FAST SERPL-MCNC: 100 MG/DL (ref 65–99)
HBA1C MFR BLD: 7.2 %
HDLC SERPL-MCNC: 53 MG/DL
LDLC SERPL CALC-MCNC: 135 MG/DL (ref 0–100)
MICROALBUMIN UR-MCNC: <7 MG/L
POTASSIUM SERPL-SCNC: 3.9 MMOL/L (ref 3.5–5.3)
PROT SERPL-MCNC: 8.1 G/DL (ref 6.4–8.4)
PSA SERPL-MCNC: 0.47 NG/ML (ref 0–4)
SODIUM SERPL-SCNC: 136 MMOL/L (ref 135–147)
TRIGL SERPL-MCNC: 96 MG/DL (ref ?–150)

## 2025-01-11 PROCEDURE — 82570 ASSAY OF URINE CREATININE: CPT

## 2025-01-11 PROCEDURE — 36415 COLL VENOUS BLD VENIPUNCTURE: CPT

## 2025-01-11 PROCEDURE — 83036 HEMOGLOBIN GLYCOSYLATED A1C: CPT

## 2025-01-11 PROCEDURE — 82043 UR ALBUMIN QUANTITATIVE: CPT

## 2025-01-11 PROCEDURE — G0103 PSA SCREENING: HCPCS

## 2025-01-11 PROCEDURE — 80053 COMPREHEN METABOLIC PANEL: CPT

## 2025-01-11 PROCEDURE — 80061 LIPID PANEL: CPT

## 2025-01-13 ENCOUNTER — RESULTS FOLLOW-UP (OUTPATIENT)
Dept: FAMILY MEDICINE CLINIC | Facility: CLINIC | Age: 60
End: 2025-01-13

## 2025-01-15 DIAGNOSIS — E11.9 TYPE 2 DIABETES MELLITUS WITHOUT COMPLICATION, WITHOUT LONG-TERM CURRENT USE OF INSULIN (HCC): ICD-10-CM

## 2025-01-15 DIAGNOSIS — E11.9 TYPE 2 DIABETES MELLITUS WITHOUT COMPLICATION, WITHOUT LONG-TERM CURRENT USE OF INSULIN (HCC): Primary | ICD-10-CM

## 2025-01-15 RX ORDER — METFORMIN HYDROCHLORIDE 500 MG/1
1000 TABLET, EXTENDED RELEASE ORAL 2 TIMES DAILY WITH MEALS
Qty: 360 TABLET | Refills: 0 | Status: SHIPPED | OUTPATIENT
Start: 2025-01-15

## 2025-01-15 NOTE — TELEPHONE ENCOUNTER
----- Message from DEVON Penny sent at 1/15/2025  9:18 AM EST -----  Thank you. Dose increased to 1,000 mg BID to total 2,000 mg daily. Will repeat labs before next OV.  ----- Message -----  From: Cassandra Johnson MA  Sent: 1/14/2025   1:32 PM EST  To: DEVON Romo    Pt said he is already taking the extended release of the metformin. But he would be fine increasing the medications if that's what you feel is best.  ----- Message -----  From: DEVON Romo  Sent: 1/14/2025  12:58 PM EST  To: [facility]    Is patient ok with increasing his medications? Metformin can be changed to extended release (XR) if patient has trouble with GI side effects in the past  ----- Message -----  From: Madelyn Daily MA  Sent: 1/13/2025  10:50 AM EST  To: DEVON Romo    Spoke with pt he is taking the Metformin 500mg in the morning, along with the Amaryl.

## 2025-02-05 ENCOUNTER — TELEPHONE (OUTPATIENT)
Dept: DIABETES SERVICES | Facility: CLINIC | Age: 60
End: 2025-02-05

## 2025-03-25 DIAGNOSIS — Z12.12 SCREENING FOR COLORECTAL CANCER: Primary | ICD-10-CM

## 2025-03-25 DIAGNOSIS — F17.211 NICOTINE DEPENDENCE, CIGARETTES, IN REMISSION: ICD-10-CM

## 2025-03-25 DIAGNOSIS — Z12.11 SCREENING FOR COLORECTAL CANCER: Primary | ICD-10-CM

## 2025-04-14 DIAGNOSIS — E11.9 TYPE 2 DIABETES MELLITUS WITHOUT COMPLICATION, WITHOUT LONG-TERM CURRENT USE OF INSULIN (HCC): ICD-10-CM

## 2025-04-15 RX ORDER — METFORMIN HYDROCHLORIDE 500 MG/1
TABLET, EXTENDED RELEASE ORAL
Qty: 360 TABLET | Refills: 0 | Status: SHIPPED | OUTPATIENT
Start: 2025-04-15

## 2025-05-08 DIAGNOSIS — E11.9 TYPE 2 DIABETES MELLITUS WITHOUT COMPLICATION, WITHOUT LONG-TERM CURRENT USE OF INSULIN (HCC): ICD-10-CM

## 2025-05-09 RX ORDER — GLIMEPIRIDE 1 MG/1
1 TABLET ORAL
Qty: 90 TABLET | Refills: 1 | Status: SHIPPED | OUTPATIENT
Start: 2025-05-09

## 2025-06-05 DIAGNOSIS — Z12.11 SCREENING FOR COLON CANCER: Primary | ICD-10-CM

## 2025-06-05 DIAGNOSIS — Z12.2 SCREENING FOR LUNG CANCER: ICD-10-CM

## 2025-08-06 ENCOUNTER — HOSPITAL ENCOUNTER (EMERGENCY)
Facility: HOSPITAL | Age: 60
Discharge: HOME | End: 2025-08-06
Attending: EMERGENCY MEDICINE
Payer: COMMERCIAL

## 2025-08-06 ENCOUNTER — APPOINTMENT (EMERGENCY)
Dept: RADIOLOGY | Facility: HOSPITAL | Age: 60
End: 2025-08-06
Payer: COMMERCIAL

## 2025-08-08 ENCOUNTER — APPOINTMENT (OUTPATIENT)
Dept: RADIOLOGY | Facility: CLINIC | Age: 60
End: 2025-08-08
Payer: COMMERCIAL

## 2025-08-08 ENCOUNTER — OFFICE VISIT (OUTPATIENT)
Dept: URGENT CARE | Facility: CLINIC | Age: 60
End: 2025-08-08
Payer: COMMERCIAL

## 2025-08-08 VITALS
OXYGEN SATURATION: 97 % | SYSTOLIC BLOOD PRESSURE: 135 MMHG | DIASTOLIC BLOOD PRESSURE: 89 MMHG | TEMPERATURE: 98.1 F | RESPIRATION RATE: 18 BRPM | HEART RATE: 72 BPM

## 2025-08-08 DIAGNOSIS — S90.121A CONTUSION OF LESSER TOE OF RIGHT FOOT WITHOUT DAMAGE TO NAIL, INITIAL ENCOUNTER: ICD-10-CM

## 2025-08-08 DIAGNOSIS — S60.051A CONTUSION OF RIGHT LITTLE FINGER WITHOUT DAMAGE TO NAIL, INITIAL ENCOUNTER: Primary | ICD-10-CM

## 2025-08-08 PROCEDURE — G0383 LEV 4 HOSP TYPE B ED VISIT: HCPCS
